# Patient Record
Sex: FEMALE | Race: WHITE | Employment: FULL TIME | URBAN - METROPOLITAN AREA
[De-identification: names, ages, dates, MRNs, and addresses within clinical notes are randomized per-mention and may not be internally consistent; named-entity substitution may affect disease eponyms.]

---

## 2018-11-20 ENCOUNTER — OFFICE VISIT (OUTPATIENT)
Dept: OBGYN CLINIC | Age: 33
End: 2018-11-20

## 2018-11-20 ENCOUNTER — TELEPHONE (OUTPATIENT)
Dept: OBGYN CLINIC | Age: 33
End: 2018-11-20

## 2018-11-20 VITALS
SYSTOLIC BLOOD PRESSURE: 109 MMHG | WEIGHT: 153 LBS | BODY MASS INDEX: 28.16 KG/M2 | HEIGHT: 62 IN | DIASTOLIC BLOOD PRESSURE: 74 MMHG

## 2018-11-20 DIAGNOSIS — O21.9 NAUSEA AND VOMITING OF PREGNANCY, ANTEPARTUM: Primary | ICD-10-CM

## 2018-11-20 PROBLEM — Z98.891 HX OF CESAREAN SECTION: Status: ACTIVE | Noted: 2018-11-20

## 2018-11-20 RX ORDER — BUPROPION HYDROCHLORIDE 150 MG/1
150 TABLET, EXTENDED RELEASE ORAL
COMMUNITY
End: 2019-03-25 | Stop reason: SDUPTHER

## 2018-11-20 RX ORDER — SERTRALINE HYDROCHLORIDE 50 MG/1
TABLET, FILM COATED ORAL
Refills: 1 | COMMUNITY
Start: 2018-10-21 | End: 2019-03-07 | Stop reason: ALTCHOICE

## 2018-11-20 RX ORDER — ONDANSETRON 4 MG/1
4 TABLET, ORALLY DISINTEGRATING ORAL
Qty: 30 TAB | Refills: 0
Start: 2018-11-20 | End: 2018-11-21

## 2018-11-20 RX ORDER — ALBUTEROL SULFATE 90 UG/1
AEROSOL, METERED RESPIRATORY (INHALATION)
COMMUNITY
Start: 2017-09-19 | End: 2018-11-30 | Stop reason: SDUPTHER

## 2018-11-20 RX ORDER — PYRIDOXINE HCL (VITAMIN B6) 25 MG
25 TABLET ORAL 2 TIMES DAILY
COMMUNITY
End: 2019-05-30 | Stop reason: ALTCHOICE

## 2018-11-20 NOTE — TELEPHONE ENCOUNTER
Patient calling stating that she is a new patient to the practice and is currently pregnant. She has her NOB appt scheduled 11/30/2018 and needed a rx for zofran to help with her nausea and vomiting. Patient advised that we cannot prescribe medication since she has not been established in our office and offered her a problem visit to come in and see Jose Cisneros CNM to get something for nausea and vomiting. She is aware that this appt is not for an US it is only for a problem visit. Patient verbalized understanding.

## 2018-11-20 NOTE — PROGRESS NOTES
Quan Jang presents for pre-NOB exam with c/o significant nausea and vomiting. She has a NOB exam with dating ultrasound scheduled for next Friday. According to her LMP she is about 7 weeks gestation. She has continued nausea and is vomiting several times per day. 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 She has taken B6 and Phenergan and is not interested in trying Unisom since it makes her sleepy and unable to be alert at work. She would like to take Zofran since she found in helpful with her last pregnancy. We reviewed the potential for cardiac arrhythmia and the ideal of taking the lowest effective dose for the shortest amount of time. We also reviewed non-pharmalogic methods of treatment to include real shawna, lemon drops, peppermint, crackers, seabands and other options. She understood and was agreeable to taking the lowest tolerable dose to relieve her symptoms. Greater than 50% of this visit was spent in counseling.

## 2018-11-20 NOTE — PROGRESS NOTES
Chief Complaint   Patient presents with    Nausea    Vomiting During Pregnancy     ,  Visit Vitals  /74   Ht 5' 2\" (1.575 m)   Wt 153 lb (69.4 kg)   BMI 27.98 kg/m²     Here today for nausea and vomiting

## 2018-11-21 ENCOUNTER — TELEPHONE (OUTPATIENT)
Dept: OBGYN CLINIC | Age: 33
End: 2018-11-21

## 2018-11-21 RX ORDER — ONDANSETRON 4 MG/1
4 TABLET, ORALLY DISINTEGRATING ORAL
Qty: 30 TAB | Refills: 0 | Status: SHIPPED | OUTPATIENT
Start: 2018-11-21 | End: 2018-11-26 | Stop reason: DRUGHIGH

## 2018-11-21 NOTE — TELEPHONE ENCOUNTER
Call received at 644am    29year old patient last seen in the office yesterday. Patient is about 7 weeks pregnant. Patient denies vaginal bleeding or cramping. Patient reports nausea and is calling to say that the prescription that was to have been sent is not at the pharmacy. This nurse resent the prescription as per Nurse mid wife order to patient preferred pharmacy as it had been sent no print. Patient verbalized understanding.

## 2018-11-26 ENCOUNTER — TELEPHONE (OUTPATIENT)
Dept: OBGYN CLINIC | Age: 33
End: 2018-11-26

## 2018-11-26 RX ORDER — ONDANSETRON 8 MG/1
8 TABLET, ORALLY DISINTEGRATING ORAL
Qty: 60 TAB | Refills: 3 | Status: SHIPPED | OUTPATIENT
Start: 2018-11-26 | End: 2019-06-17 | Stop reason: SDUPTHER

## 2018-11-26 NOTE — TELEPHONE ENCOUNTER
Call received at 335PM    29year old  about 7 weeks pregnant . Patient last seen in the office on 18. Patient denies vaginal bleeding and cramping. Patient calling to report that the 4 mg of Zofran was not working and she has doubled to taking 8 mg as per Nurse Mid wife Lj White. Patient reports the 8 mg is helping and gives her 6-8 hours of relief. Patient reports she only has two pills left    Please advised of prescription details.

## 2018-11-26 NOTE — TELEPHONE ENCOUNTER
Called the patient and left a message about the prescription for ondansetron 8 mg tabs e-Rx'd to her pharmacy.

## 2018-11-30 ENCOUNTER — INITIAL PRENATAL (OUTPATIENT)
Dept: OBGYN CLINIC | Age: 33
End: 2018-11-30

## 2018-11-30 ENCOUNTER — OFFICE VISIT (OUTPATIENT)
Dept: OBGYN CLINIC | Age: 33
End: 2018-11-30

## 2018-11-30 VITALS
HEIGHT: 62 IN | BODY MASS INDEX: 28.43 KG/M2 | WEIGHT: 154.5 LBS | HEART RATE: 90 BPM | SYSTOLIC BLOOD PRESSURE: 111 MMHG | DIASTOLIC BLOOD PRESSURE: 68 MMHG

## 2018-11-30 DIAGNOSIS — Z34.80 SUPERVISION OF OTHER NORMAL PREGNANCY, ANTEPARTUM: ICD-10-CM

## 2018-11-30 DIAGNOSIS — Z34.81 ENCOUNTER FOR SUPERVISION OF OTHER NORMAL PREGNANCY IN FIRST TRIMESTER: Primary | ICD-10-CM

## 2018-11-30 DIAGNOSIS — O09.01 SUPERVISION OF PREGNANCY WITH HISTORY OF INFERTILITY IN FIRST TRIMESTER: ICD-10-CM

## 2018-11-30 PROBLEM — B00.9 HSV-2 INFECTION: Status: ACTIVE | Noted: 2018-11-30

## 2018-11-30 PROBLEM — F41.9 ANXIETY: Status: ACTIVE | Noted: 2018-11-30

## 2018-11-30 LAB
BILIRUB UR QL STRIP: NEGATIVE
GLUCOSE UR-MCNC: NEGATIVE MG/DL
KETONES P FAST UR STRIP-MCNC: NEGATIVE MG/DL
PH UR STRIP: 8 [PH] (ref 4.6–8)
PROT UR QL STRIP: NEGATIVE
SP GR UR STRIP: 1.01 (ref 1–1.03)
UA UROBILINOGEN AMB POC: NORMAL (ref 0.2–1)
URINALYSIS CLARITY POC: CLEAR
URINALYSIS COLOR POC: YELLOW
URINE BLOOD POC: NEGATIVE
URINE LEUKOCYTES POC: NEGATIVE
URINE NITRITES POC: NEGATIVE

## 2018-11-30 RX ORDER — CETIRIZINE HCL 10 MG
TABLET ORAL
COMMUNITY

## 2018-11-30 NOTE — PROGRESS NOTES
Chief Complaint Patient presents with  Pregnancy Visit Vitals /68 Pulse 90 Ht 5' 2\" (1.575 m) Wt 154 lb 8 oz (70.1 kg) LMP 10/03/2018 (Exact Date) BMI 28.26 kg/m² 1. Have you been to the ER, urgent care clinic since your last visit? Hospitalized since your last visit? No 
 
2. Have you seen or consulted any other health care providers outside of the [address] [place] [address] since your last visit? Include any pap smears or colon screening. No  
 
Here today for planned pregnancy

## 2018-11-30 NOTE — PROGRESS NOTES
Subjective:  
 
34yo   woman is being seen today for her first obstetrical visit. This  a planned pregnancy. She's been trying to conceive for 2 years - unexplained fertility. Her LMP was 10/3/18. With an [facility] of  7/15/18 by [address] today. She's had difficulty with N/V this pregnancy but it is now under control with Zofran 8 mg Her obstetrical history is significant for  delivery for breech. Her medical history is significant for HSV type 2, anxiety/depression, h/o LEEP, asthma Her current medications include: Prenatal vitamins, albuterol inhaler, Wellbutrin, Zoloft, Zofran She is seeking midwifery care Denies travel to [facility] affected area. Last pap 2017 History fully reviewed- see chart See patient intake form for complete ROS, scanned in pt chart, under media tab. ROS negative other than nausea Objective:  
 
Refer to Prenatal Flowsheet and Physical for exam findings. Fundal Height 7 cm The patient appears well, alert, oriented x 3, in no distress. Visit Vitals /68 Pulse 90 Ht 5' 2\" (1.575 m) Wt 154 lb 8 oz (70.1 kg) LMP 10/03/2018 (Exact Date) BMI 28.26 kg/m² Neck supple. No adenopathy or thyromegaly. Lungs are clear, good air entry, no wheezes, rhonchi or rales. S1 and S2 normal, no murmurs, regular rate and rhythm. Abdomen soft without tenderness, guarding, mass or organomegaly. Extremities show no edema. Neurological is normal, no focal findings. BREAST EXAM: breasts appear normal, no suspicious masses, no skin or nipple changes or axillary nodes PELVIC EXAM: normal external genitalia, vulva, vagina, cervix, uterus and adnexa Pap smear collected Assessment:  
 
Pregnancy at  7 and 4/7 weeks HSV II 
H/o LTCS desires  Anxiety/depression managed with Wellbutrin and Zoloft Nausea and vomiting of pregnancy managed on Zofran 
 
 
Plan:  
 
Labs: New OB lab slip given. Urine culture and GC/Chlamydia done Pap 2017 normal per patient Continue prenatal vitamins. Genetic screening options reviewed. At this time the patient declines Midwifery care/philosophy discussed including MD collaboration New OB packet given and reviewed, including nutrition, exercise, what to expect at prenatal visits, common complaints, danger signs and practice info. Invited to Centering pregnancy group meeting 12/13/18. Given brochure with provider, dates, and time. At this time patient plans to attend. Follow-up in 2 weeks

## 2018-11-30 NOTE — ASSESSMENT & PLAN NOTE
Collaborating MD 
Centering desires WILLA by: [address] 7/15/18 Genetic screening declines Social Lives with , Anton Camacho, of 3 years, her daughter Collins Brito) and his daughter (9yo) NOB labs 20 wk anatomy Gtt Rhogam 
Tdap    Flu Circ Labor Plans Medical risk factors:  issues of concern: H/o LTCS for breech desires  
HSV type 2 - no outbreaks since initial 
Anxiety/depression

## 2018-12-02 LAB
BACTERIA UR CULT: NO GROWTH
C TRACH RRNA SPEC QL NAA+PROBE: NEGATIVE
N GONORRHOEA RRNA SPEC QL NAA+PROBE: NEGATIVE

## 2018-12-04 LAB
25(OH)D3+25(OH)D2 SERPL-MCNC: 26.3 NG/ML (ref 30–100)
ABO GROUP BLD: NORMAL
BILIRUB UR QL STRIP: NEGATIVE
BLD GP AB SCN SERPL QL: NEGATIVE
ERYTHROCYTE [DISTWIDTH] IN BLOOD BY AUTOMATED COUNT: 13.4 % (ref 12.3–15.4)
GLUCOSE UR-MCNC: NEGATIVE MG/DL
HBV SURFACE AG SERPL QL IA: NEGATIVE
HCT VFR BLD AUTO: 38.8 % (ref 34–46.6)
HGB BLD-MCNC: 12.5 G/DL (ref 11.1–15.9)
HIV 1+2 AB+HIV1 P24 AG SERPL QL IA: NON REACTIVE
KETONES P FAST UR STRIP-MCNC: NEGATIVE MG/DL
MCH RBC QN AUTO: 30 PG (ref 26.6–33)
MCHC RBC AUTO-ENTMCNC: 32.2 G/DL (ref 31.5–35.7)
MCV RBC AUTO: 93 FL (ref 79–97)
PH UR STRIP: 8 [PH] (ref 4.6–8)
PLATELET # BLD AUTO: 223 X10E3/UL (ref 150–379)
PROT UR QL STRIP: NEGATIVE
RBC # BLD AUTO: 4.16 X10E6/UL (ref 3.77–5.28)
RH BLD: POSITIVE
RPR SER QL: NON REACTIVE
RUBV IGG SERPL IA-ACNC: 3.96 INDEX
SP GR UR STRIP: 1.01 (ref 1–1.03)
T PALLIDUM AB SER QL IA: NEGATIVE
UA UROBILINOGEN AMB POC: NORMAL (ref 0.2–1)
URINALYSIS CLARITY POC: CLEAR
URINALYSIS COLOR POC: YELLOW
URINE BLOOD POC: NEGATIVE
URINE LEUKOCYTES POC: NEGATIVE
URINE NITRITES POC: NEGATIVE
WBC # BLD AUTO: 7.6 X10E3/UL (ref 3.4–10.8)

## 2018-12-04 NOTE — PROGRESS NOTES
Subjective:     Duke Johnson, 28 yo   woman is being seen today for her first obstetrical visit. This planned a planned pregnancy. Her LMP was 10/3/18. With an [address] of  7/10/18    US today showed viable pregnancy with EDC of 7/15/18  Her obstetrical history is significant for LTCS for breech presentation. Her medical history is significant for anxiety/depression, asthma  Her current medications include: Prenatal vitamins  She is seeking midwifery care  Denies travel to [facility] affected area. History fully reviewed- see chart    See patient intake form for complete ROS, scanned in pt chart, under media tab. ROS negative    Objective:     Refer to Prenatal Flowsheet and Physical for exam findings. Fundal Height 7-8 weeks    The patient appears well, alert, oriented x 3, in no distress. Visit Vitals  LMP 10/03/2018 (Exact Date)   Breastfeeding? No     Neck supple. No adenopathy or thyromegaly. Lungs are clear, good air entry, no wheezes, rhonchi or rales. S1 and S2 normal, no murmurs, regular rate and rhythm. Abdomen soft without tenderness, guarding, mass or organomegaly. Extremities show no edema. Neurological is normal, no focal findings. BREAST EXAM: breasts appear normal, no suspicious masses, no skin or nipple changes or axillary nodes    PELVIC EXAM: VULVA: normal appearing vulva with no masses, tenderness or lesions   Pap smear collected    Assessment:     Pregnancy at  7 and 3/7 weeks    Plan:     Labs: New OB lab slip given. Urine culture and GC/Chlamydia  Continue prenatal vitamins. Genetic screening options reviewed. At this time the patient declines  Midwifery care/philosophy discussed including MD collaboration  New OB packet given and reviewed, including nutrition, exercise, what to expect at prenatal visits, common complaints, danger signs and practice info. Invited to Centering pregnancy group and she is interested.  Given brochure with provider, dates, and time.   Follow-up in 4 weeks

## 2018-12-13 NOTE — PROGRESS NOTES
Tried to call the patient and had a busy signal.  Since all lab results were normal with the exception of the Vitamin D, will review at next visit.

## 2018-12-19 ENCOUNTER — ROUTINE PRENATAL (OUTPATIENT)
Dept: OBGYN CLINIC | Age: 33
End: 2018-12-19

## 2018-12-19 VITALS — DIASTOLIC BLOOD PRESSURE: 77 MMHG | SYSTOLIC BLOOD PRESSURE: 128 MMHG | BODY MASS INDEX: 29.12 KG/M2 | WEIGHT: 159.2 LBS

## 2018-12-19 DIAGNOSIS — Z34.81 ENCOUNTER FOR SUPERVISION OF OTHER NORMAL PREGNANCY IN FIRST TRIMESTER: Primary | ICD-10-CM

## 2018-12-19 DIAGNOSIS — O26.891 DYSURIA DURING PREGNANCY IN FIRST TRIMESTER: ICD-10-CM

## 2018-12-19 DIAGNOSIS — R30.0 DYSURIA DURING PREGNANCY IN FIRST TRIMESTER: ICD-10-CM

## 2018-12-19 RX ORDER — NITROFURANTOIN 25; 75 MG/1; MG/1
100 CAPSULE ORAL 2 TIMES DAILY
Qty: 14 CAP | Refills: 0 | Status: SHIPPED | OUTPATIENT
Start: 2018-12-19 | End: 2019-01-03 | Stop reason: ALTCHOICE

## 2018-12-19 NOTE — PROGRESS NOTES
S:  Presents with c/o pain with urination x 2 days. States she is managing nausea with taking one zofran most days. Denies travel to [place] affected area. O: See prenatal flowsheet for maternal and fetal exam  Blood pressure 128/77, weight 159 lb 3.2 oz (72.2 kg), last menstrual period 10/03/2018. A:  11w0d   Size=Dates    P: Reviewed lab work. Will start taking Vitamin D.   Rx Macrobid  Urine culture and sensitivity  Discussed prenatal classes  See prenatal checklist for other topics covered during today's visit  RTO in 4 weeks for GINO with CHENTE

## 2018-12-19 NOTE — PROGRESS NOTES
Chief Complaint   Patient presents with    Routine Prenatal Visit     1. Have you been to the ER, urgent care clinic since your last visit? Hospitalized since your last visit? No    2. Have you seen or consulted any other health care providers outside of the [address] [place] [address] since your last visit? Include any pap smears or colon screening. No       Here today for a routine prenatal visit and she has no complaints or concerns.

## 2018-12-21 LAB — BACTERIA UR CULT: NORMAL

## 2019-01-03 ENCOUNTER — ROUTINE PRENATAL (OUTPATIENT)
Dept: OBGYN CLINIC | Age: 34
End: 2019-01-03

## 2019-01-03 ENCOUNTER — LAB ONLY (OUTPATIENT)
Dept: OBGYN CLINIC | Age: 34
End: 2019-01-03

## 2019-01-03 VITALS — DIASTOLIC BLOOD PRESSURE: 68 MMHG | SYSTOLIC BLOOD PRESSURE: 115 MMHG | WEIGHT: 159 LBS | BODY MASS INDEX: 29.08 KG/M2

## 2019-01-03 DIAGNOSIS — Z34.81 ENCOUNTER FOR SUPERVISION OF OTHER NORMAL PREGNANCY IN FIRST TRIMESTER: Primary | ICD-10-CM

## 2019-01-03 DIAGNOSIS — Z34.80 SUPERVISION OF OTHER NORMAL PREGNANCY, ANTEPARTUM: Primary | ICD-10-CM

## 2019-01-03 DIAGNOSIS — Z98.891 HX OF CESAREAN SECTION: ICD-10-CM

## 2019-01-04 NOTE — PROGRESS NOTES
Centering session 1    Reviewed group expectations, nutrition, weight gain, fetal growth and development, measurement of gestational age, weight and blood pressure, schedule for visits, routine tests, and setting personal goals. Doing well, no complaints today. FHR obtained by Ultrasound.   Had flu vaccine at work  See prenatal flowsheet    P:  NIPT today  20 week ordered at next visit   RTO 4 weeks

## 2019-01-24 ENCOUNTER — ROUTINE PRENATAL (OUTPATIENT)
Dept: OBGYN CLINIC | Age: 34
End: 2019-01-24

## 2019-01-24 VITALS — DIASTOLIC BLOOD PRESSURE: 70 MMHG | SYSTOLIC BLOOD PRESSURE: 112 MMHG | WEIGHT: 160 LBS | BODY MASS INDEX: 29.26 KG/M2

## 2019-01-24 DIAGNOSIS — Z34.80 SUPERVISION OF OTHER NORMAL PREGNANCY, ANTEPARTUM: Primary | ICD-10-CM

## 2019-01-25 NOTE — PROGRESS NOTES
Centering session 2  Xavi Garsia, PT and Bhupinder Chacon PT present  Reviewed exercise recommendations, body mechanics  Reviewed pregnancy changes and common discomforts  Dental care  Emotional changes in pregnancy discussed    Pt is doing well, no complaints.   Anatomy ultrasound scheduled  See flowsheet    RTO 3 weeks

## 2019-02-21 ENCOUNTER — ROUTINE PRENATAL (OUTPATIENT)
Dept: OBGYN CLINIC | Age: 34
End: 2019-02-21

## 2019-02-21 VITALS — WEIGHT: 161.8 LBS | DIASTOLIC BLOOD PRESSURE: 72 MMHG | SYSTOLIC BLOOD PRESSURE: 122 MMHG | BODY MASS INDEX: 29.59 KG/M2

## 2019-02-21 DIAGNOSIS — Z34.82 ENCOUNTER FOR SUPERVISION OF NORMAL PREGNANCY IN MULTIGRAVIDA IN SECOND TRIMESTER: Primary | ICD-10-CM

## 2019-02-21 NOTE — PROGRESS NOTES
S: Feeling well. No significant complaints or concerns. Reports consistent, daily fetal mvmt. Denies ctxs, LOF, or vaginal bldng. [facility]. O: See prenatal flowsheet for maternal and fetal exam  Last menstrual period 10/03/2018.     A:  20w1d   Size=Dates    P: Reviewed US  See prenatal checklist for other topics covered during today's visit  RTO in 2 weeks for Centering

## 2019-02-21 NOTE — PROGRESS NOTES
Chief Complaint   Patient presents with    Routine Prenatal Visit     Visit Vitals  /72   Wt 161 lb 12.8 oz (73.4 kg)   LMP 10/03/2018 (Exact Date)   BMI 29.59 kg/m²     1. Have you been to the ER, urgent care clinic since your last visit? Hospitalized since your last visit? No    2. Have you seen or consulted any other health care providers outside of the [address] [place] [address] since your last visit? Include any pap smears or colon screening. No     Here today for a routine prenatal visit and she has no complaints or concerns.

## 2019-03-07 ENCOUNTER — ROUTINE PRENATAL (OUTPATIENT)
Dept: OBGYN CLINIC | Age: 34
End: 2019-03-07

## 2019-03-07 VITALS — BODY MASS INDEX: 29.9 KG/M2 | DIASTOLIC BLOOD PRESSURE: 62 MMHG | SYSTOLIC BLOOD PRESSURE: 106 MMHG | WEIGHT: 163.5 LBS

## 2019-03-07 DIAGNOSIS — Z98.891 HX OF CESAREAN SECTION: ICD-10-CM

## 2019-03-07 DIAGNOSIS — Z34.82 ENCOUNTER FOR SUPERVISION OF NORMAL PREGNANCY IN MULTIGRAVIDA IN SECOND TRIMESTER: Primary | ICD-10-CM

## 2019-03-25 RX ORDER — SERTRALINE HYDROCHLORIDE 50 MG/1
50 TABLET, FILM COATED ORAL DAILY
Refills: 5 | COMMUNITY
Start: 2019-03-03 | End: 2019-03-29 | Stop reason: DRUGHIGH

## 2019-03-25 RX ORDER — BUPROPION HYDROCHLORIDE 150 MG/1
150 TABLET, EXTENDED RELEASE ORAL DAILY
Qty: 30 TAB | Refills: 5 | Status: SHIPPED | OUTPATIENT
Start: 2019-03-25 | End: 2019-10-15 | Stop reason: SDUPTHER

## 2019-03-28 ENCOUNTER — ROUTINE PRENATAL (OUTPATIENT)
Dept: OBGYN CLINIC | Age: 34
End: 2019-03-28

## 2019-03-28 DIAGNOSIS — O99.340 ANXIETY DISORDER AFFECTING PREGNANCY, ANTEPARTUM: ICD-10-CM

## 2019-03-28 DIAGNOSIS — F41.9 ANXIETY DISORDER AFFECTING PREGNANCY, ANTEPARTUM: ICD-10-CM

## 2019-03-28 DIAGNOSIS — O09.01 SUPERVISION OF PREGNANCY WITH HISTORY OF INFERTILITY IN FIRST TRIMESTER: Primary | ICD-10-CM

## 2019-03-29 VITALS — WEIGHT: 170 LBS | SYSTOLIC BLOOD PRESSURE: 106 MMHG | BODY MASS INDEX: 31.09 KG/M2 | DIASTOLIC BLOOD PRESSURE: 68 MMHG

## 2019-03-29 RX ORDER — SERTRALINE HYDROCHLORIDE 50 MG/1
100 TABLET, FILM COATED ORAL DAILY
Qty: 30 TAB | Refills: 1 | Status: SHIPPED | OUTPATIENT
Start: 2019-03-29 | End: 2019-05-06 | Stop reason: SDUPTHER

## 2019-04-17 ENCOUNTER — ROUTINE PRENATAL (OUTPATIENT)
Dept: OBGYN CLINIC | Age: 34
End: 2019-04-17

## 2019-04-17 VITALS — WEIGHT: 169 LBS | SYSTOLIC BLOOD PRESSURE: 119 MMHG | DIASTOLIC BLOOD PRESSURE: 66 MMHG | BODY MASS INDEX: 30.91 KG/M2

## 2019-04-17 DIAGNOSIS — Z3A.28 28 WEEKS GESTATION OF PREGNANCY: Primary | ICD-10-CM

## 2019-04-17 DIAGNOSIS — Z23 ENCOUNTER FOR IMMUNIZATION: ICD-10-CM

## 2019-04-17 DIAGNOSIS — Z34.80 SUPERVISION OF OTHER NORMAL PREGNANCY, ANTEPARTUM: ICD-10-CM

## 2019-04-17 NOTE — PROGRESS NOTES
Chief Complaint   Patient presents with    Routine Prenatal Visit     Visit Vitals  /66   Wt 169 lb (76.7 kg)   LMP 10/03/2018 (Exact Date)   BMI 30.91 kg/m²     1. Have you been to the ER, urgent care clinic since your last visit? Hospitalized since your last visit? No    2. Have you seen or consulted any other health care providers outside of the [address] [place] [address] since your last visit? Include any pap smears or colon screening. No     Here today for a routine prenatal visit and she has no complaints or concerns. After obtaining consent, and per orders of aggie delgado cnm, injection of tdap given in left deltoid by Chris Call RN. Patient instructed to remain in clinic for 20 minutes afterwards, and to report any adverse reaction to me immediately. Lot: [id number] Exp: 6/14/21 NDC: 30438-149-14 , VIS given.

## 2019-04-17 NOTE — PROGRESS NOTES
S: Feeling well. No significant complaints or concerns. Reports consistent, daily fetal mvmt. Denies ctxs, LOF, or vaginal bldng. [facility]. O: See prenatal flowsheet for maternal and fetal exam  Blood pressure 119/66, weight 169 lb (76.7 kg), last menstrual period 10/03/2018. A:  28w0d   Size=Dates    P: Third trimester labs today  Tdap given  See prenatal checklist for other topics covered during today's visit  RTO in 1 weeks for Centering    Discussed USPFT recommendation for Tdap during every pregnancy, optimal timing of administration between 27 to 36 weeks gestation-although can be done at any time during pregnancy, and that people that will be in close contact with her infant during the first year should also receive a Tdap vaccine if they have not previously received the Tdap vaccine. Pt states understanding. Pt received Tdap vaccine today.

## 2019-04-18 LAB
ERYTHROCYTE [DISTWIDTH] IN BLOOD BY AUTOMATED COUNT: 13.4 % (ref 12.3–15.4)
GLUCOSE 1H P 50 G GLC PO SERPL-MCNC: 115 MG/DL (ref 65–139)
HCT VFR BLD AUTO: 34.6 % (ref 34–46.6)
HGB BLD-MCNC: 11.4 G/DL (ref 11.1–15.9)
MCH RBC QN AUTO: 30.7 PG (ref 26.6–33)
MCHC RBC AUTO-ENTMCNC: 32.9 G/DL (ref 31.5–35.7)
MCV RBC AUTO: 93 FL (ref 79–97)
PLATELET # BLD AUTO: 148 X10E3/UL (ref 150–379)
RBC # BLD AUTO: 3.71 X10E6/UL (ref 3.77–5.28)
WBC # BLD AUTO: 8.8 X10E3/UL (ref 3.4–10.8)

## 2019-04-19 PROBLEM — D69.6 THROMBOCYTOPENIA AFFECTING PREGNANCY (HCC): Status: ACTIVE | Noted: 2019-04-19

## 2019-04-19 PROBLEM — O99.119 THROMBOCYTOPENIA AFFECTING PREGNANCY (HCC): Status: ACTIVE | Noted: 2019-04-19

## 2019-05-01 ENCOUNTER — TELEPHONE (OUTPATIENT)
Dept: OBGYN CLINIC | Age: 34
End: 2019-05-01

## 2019-05-01 DIAGNOSIS — R10.2 PELVIC PAIN AFFECTING PREGNANCY IN THIRD TRIMESTER, ANTEPARTUM: Primary | ICD-10-CM

## 2019-05-01 DIAGNOSIS — O26.893 PELVIC PAIN AFFECTING PREGNANCY IN THIRD TRIMESTER, ANTEPARTUM: Primary | ICD-10-CM

## 2019-05-01 NOTE — TELEPHONE ENCOUNTER
Isabela Albarran called and left message. I called her and got voice mail and left message to have her call me back on listed cell/home phone number.

## 2019-05-06 DIAGNOSIS — O99.340 ANXIETY DISORDER AFFECTING PREGNANCY, ANTEPARTUM: ICD-10-CM

## 2019-05-06 DIAGNOSIS — F41.9 ANXIETY DISORDER AFFECTING PREGNANCY, ANTEPARTUM: ICD-10-CM

## 2019-05-06 RX ORDER — SERTRALINE HYDROCHLORIDE 50 MG/1
TABLET, FILM COATED ORAL
Qty: 30 TAB | Refills: 0 | Status: SHIPPED | OUTPATIENT
Start: 2019-05-06 | End: 2019-05-30 | Stop reason: SDUPTHER

## 2019-05-09 ENCOUNTER — ROUTINE PRENATAL (OUTPATIENT)
Dept: OBGYN CLINIC | Age: 34
End: 2019-05-09

## 2019-05-09 VITALS — DIASTOLIC BLOOD PRESSURE: 66 MMHG | SYSTOLIC BLOOD PRESSURE: 105 MMHG | BODY MASS INDEX: 31.93 KG/M2 | WEIGHT: 174.6 LBS

## 2019-05-09 DIAGNOSIS — Z98.891 HX OF CESAREAN SECTION: Primary | ICD-10-CM

## 2019-05-09 DIAGNOSIS — Z34.80 SUPERVISION OF OTHER NORMAL PREGNANCY, ANTEPARTUM: ICD-10-CM

## 2019-05-10 NOTE — PROGRESS NOTES
Centering Session 7    Feeling well. Denies complaints. Reports good FM. Denies UC's, menstrual-like cramping, VB or LOF.    VSS. See prenatal flowsheet for maternal and fetal exam    IUP at 31+1 weeks  Labs reviewed. S=D  Attended TOLAC class    Plan:   care reviewed, including sleeping safety, bathing safety, feeding safety, immunizations, diapering, responding to infant cues, circumcision, SIDs, and other general safety concerns r/t /. Discussed selection of Pediatrician. Reviewed fetal movement counts.   RTO 2 weeks

## 2019-05-23 ENCOUNTER — ROUTINE PRENATAL (OUTPATIENT)
Dept: OBGYN CLINIC | Age: 34
End: 2019-05-23

## 2019-05-23 VITALS
HEIGHT: 62 IN | WEIGHT: 172 LBS | BODY MASS INDEX: 31.65 KG/M2 | SYSTOLIC BLOOD PRESSURE: 123 MMHG | HEART RATE: 134 BPM | DIASTOLIC BLOOD PRESSURE: 77 MMHG

## 2019-05-23 DIAGNOSIS — D69.6 THROMBOCYTOPENIA (HCC): Primary | ICD-10-CM

## 2019-05-23 DIAGNOSIS — O99.119 THROMBOCYTOPENIA AFFECTING PREGNANCY (HCC): ICD-10-CM

## 2019-05-23 DIAGNOSIS — Z34.80 SUPERVISION OF OTHER NORMAL PREGNANCY, ANTEPARTUM: ICD-10-CM

## 2019-05-23 DIAGNOSIS — D69.6 THROMBOCYTOPENIA AFFECTING PREGNANCY (HCC): ICD-10-CM

## 2019-05-23 NOTE — PROGRESS NOTES
Midwife pt. Here for TOLAC counseling. Prev LTCS for breech. Op report reviewed in [facility]. Had ROM with onset of spontaneous labor. Counseled risk of uterine rupture 0.5-1% with spontaneous labor. This can be catastrophic. Risks to baby include permanent brain damage, mental retardation, CP or even fetal death. Risks to her include hemorrhage, need for transfusion, hysterectomy, surgical complications including injury to bowel and bladder. In general, would be hesitant to induce labor as this can significantly increase the risk for above complications. [facility]  calculator estimates 63% chance of successful . ACOG info provided. Can schedule C/S for 41-42wks (or sooner if she prefers). Advised to let midwives know and then they can notify me to schedule. No other c/o +fm. Plt mildly decreased ()=148. Reports h/o mild thrombocytopenia to 120s with previous pregnancy. Will rpt CBC today.

## 2019-05-23 NOTE — PATIENT INSTRUCTIONS
Weeks 32 to 34 of Your Pregnancy: Care Instructions  Your Care Instructions    During the last few weeks of your pregnancy, you may have more aches and pains. It's important to rest when you can. Your growing baby is putting more pressure on your bladder. So you may need to urinate more often. Hemorrhoids are also common. These are painful, itchy veins in the rectal area. In the 36th week, most women have a test for group B streptococcus (GBS). GBS is a common bacteria that can live in the vagina and rectum. It can make your baby sick after birth. If you test positive, you will get antibiotics during labor. These will keep your baby from getting the bacteria. You may want to talk with your doctor about banking your baby's umbilical cord blood. This is the blood left in the cord after birth. If you want to save this blood, you must arrange it ahead of time. You can't decide at the last minute. If you haven't already had the Tdap shot during this pregnancy, talk to your doctor about getting it. It will help protect your  against pertussis infection. Follow-up care is a key part of your treatment and safety. Be sure to make and go to all appointments, and call your doctor if you are having problems. It's also a good idea to know your test results and keep a list of the medicines you take. How can you care for yourself at home? Ease hemorrhoids  · Get more liquids, fruits, vegetables, and fiber in your diet. This will help keep your stools soft. · Avoid sitting for too long. Lie on your left side several times a day. · Clean yourself with soft, moist toilet paper. Or you can use witch hazel pads or personal hygiene pads. · If you are uncomfortable, try ice packs. Or you can sit in a warm sitz bath. Do these for 20 minutes at a time, as needed. · Use hydrocortisone cream for pain and itching. Two examples are Anusol and Preparation H Hydrocortisone.   · Ask your doctor about taking an over-the-counter stool softener. Consider breastfeeding  · Experts recommend that women breastfeed for 1 year or longer. Breast milk is the perfect food for babies. · Breast milk is easier for babies to digest than formula. And it is always available, just the right temperature, and free. · Breast milk may help protect your child from some health problems.  babies are less likely than formula-fed babies to:  ? Get ear infections, colds, diarrhea, and pneumonia. ? Be obese or get diabetes later in life. · Women who breastfeed have less bleeding after the birth. Their uteruses also shrink back faster. · Some women who breastfeed lose weight faster. Making milk burns calories. · Breastfeeding can lower your risk of breast cancer, ovarian cancer, and osteoporosis. Decide about circumcision for boys  · As you make this decision, it may help to think about your personal, Yazidi, and family traditions. You get to decide if you will keep your son's penis natural or if he will be circumcised. · If you decide that you would like to have your baby circumcised, talk with your doctor. You can share your concerns about pain. And you can discuss your preferences for anesthesia. Where can you learn more? Go to http://laith-franklin.info/. Enter K381 in the search box to learn more about \"Weeks 32 to 34 of Your Pregnancy: Care Instructions. \"  Current as of: September 5, 2018  Content Version: 11.9  © 1522-8937 [employer]. Care instructions adapted under license by [employer] (which disclaims liability or warranty for this information). If you have questions about a medical condition or this instruction, always ask your healthcare professional. [place] [address] any warranty or liability for your use of this information.

## 2019-05-24 LAB
ERYTHROCYTE [DISTWIDTH] IN BLOOD BY AUTOMATED COUNT: 13.7 % (ref 12.3–15.4)
HCT VFR BLD AUTO: 33 % (ref 34–46.6)
HGB BLD-MCNC: 10.9 G/DL (ref 11.1–15.9)
MCH RBC QN AUTO: 29.5 PG (ref 26.6–33)
MCHC RBC AUTO-ENTMCNC: 33 G/DL (ref 31.5–35.7)
MCV RBC AUTO: 89 FL (ref 79–97)
PLATELET # BLD AUTO: 158 X10E3/UL (ref 150–450)
RBC # BLD AUTO: 3.69 X10E6/UL (ref 3.77–5.28)
WBC # BLD AUTO: 10.2 X10E3/UL (ref 3.4–10.8)

## 2019-05-24 NOTE — PROGRESS NOTES
FYI.  Plt normal.  I would repeat in 4wks as she reports h/o gestational thrombocytopenia with previous pregnancy.

## 2019-05-30 ENCOUNTER — ROUTINE PRENATAL (OUTPATIENT)
Dept: OBGYN CLINIC | Age: 34
End: 2019-05-30

## 2019-05-30 VITALS — DIASTOLIC BLOOD PRESSURE: 68 MMHG | SYSTOLIC BLOOD PRESSURE: 106 MMHG | BODY MASS INDEX: 31.93 KG/M2 | WEIGHT: 174.6 LBS

## 2019-05-30 DIAGNOSIS — J06.9 URI WITH COUGH AND CONGESTION: ICD-10-CM

## 2019-05-30 DIAGNOSIS — O99.340 ANXIETY DISORDER AFFECTING PREGNANCY, ANTEPARTUM: ICD-10-CM

## 2019-05-30 DIAGNOSIS — F41.9 ANXIETY DISORDER AFFECTING PREGNANCY, ANTEPARTUM: ICD-10-CM

## 2019-05-30 DIAGNOSIS — Z98.891 HX OF CESAREAN SECTION: ICD-10-CM

## 2019-05-30 DIAGNOSIS — Z34.80 SUPERVISION OF OTHER NORMAL PREGNANCY, ANTEPARTUM: Primary | ICD-10-CM

## 2019-05-30 RX ORDER — SERTRALINE HYDROCHLORIDE 50 MG/1
TABLET, FILM COATED ORAL
Qty: 30 TAB | Refills: 0 | Status: SHIPPED | OUTPATIENT
Start: 2019-05-30 | End: 2019-07-04 | Stop reason: SDUPTHER

## 2019-05-30 RX ORDER — CODEINE PHOSPHATE AND GUAIFENESIN 10; 100 MG/5ML; MG/5ML
5 SOLUTION ORAL
Qty: 75 ML | Refills: 0 | Status: SHIPPED | OUTPATIENT
Start: 2019-05-30 | End: 2019-06-04

## 2019-05-31 NOTE — PROGRESS NOTES
S: Feeling fair. URI symtoms with nighttime cough and difficulty sleeping due to same. Reports consistent, daily fetal mvmt. Denies ctxs, LOF, or vaginal bldng. Centering visit 9 with final pregnancy issues, TOLAC  visit and PP care reviewed. Support given and PP depression reviewed. O: See prenatal flowsheet for maternal and fetal exam. Plts reviewed. No fever. Blood pressure 106/68, weight 174 lb 9.6 oz (79.2 kg), last menstrual period 10/03/2018. A:  34w1d   Size=Dates  URI without evidence of need for antibiotics. P: Robitussin with codeine for cough with zyrtec. Repeat plts at 36 weeks.   See prenatal checklist for other topics covered during today's visit  RTO in 2 weeks for GINO with CNM

## 2019-06-05 ENCOUNTER — TELEPHONE (OUTPATIENT)
Dept: OBGYN CLINIC | Age: 34
End: 2019-06-05

## 2019-06-12 ENCOUNTER — ROUTINE PRENATAL (OUTPATIENT)
Dept: OBGYN CLINIC | Age: 34
End: 2019-06-12

## 2019-06-12 VITALS
HEART RATE: 114 BPM | BODY MASS INDEX: 31.83 KG/M2 | HEIGHT: 62 IN | DIASTOLIC BLOOD PRESSURE: 72 MMHG | WEIGHT: 173 LBS | SYSTOLIC BLOOD PRESSURE: 115 MMHG

## 2019-06-12 DIAGNOSIS — B00.9 HSV-2 INFECTION COMPLICATING PREGNANCY, THIRD TRIMESTER: ICD-10-CM

## 2019-06-12 DIAGNOSIS — Z98.891 HX OF CESAREAN SECTION: ICD-10-CM

## 2019-06-12 DIAGNOSIS — O98.513 HSV-2 INFECTION COMPLICATING PREGNANCY, THIRD TRIMESTER: ICD-10-CM

## 2019-06-12 DIAGNOSIS — Z3A.36 36 WEEKS GESTATION OF PREGNANCY: ICD-10-CM

## 2019-06-12 DIAGNOSIS — Z34.80 SUPERVISION OF OTHER NORMAL PREGNANCY, ANTEPARTUM: Primary | ICD-10-CM

## 2019-06-12 RX ORDER — VALACYCLOVIR HYDROCHLORIDE 1 G/1
1000 TABLET, FILM COATED ORAL DAILY
Qty: 30 TAB | Refills: 0 | Status: SHIPPED | OUTPATIENT
Start: 2019-06-12 | End: 2019-07-25

## 2019-06-12 NOTE — PROGRESS NOTES
S: Feeling poorly - having nausea and diarrhea. Reports consistent, daily fetal mvmt. Denies ctxs, LOF, or vaginal bldng. Denies travel to [facility] affected area. O: See prenatal flowsheet for maternal and fetal exam  Last menstrual period 10/03/2018. A:  36w0d   Size=Dates  FH 35 Cm, PDT130  bpm  Cervical exam 0/50/-4    P:  Orders Placed This Encounter    GROUP B STREP BY SARA    valACYclovir (VALTREX) 1 gram tablet     Sig: Take 1 Tab by mouth daily.  Indications: suppression of HSV during labor     Dispense:  30 Tab     Refill:  0       Reviewed \"three sisters of balance\" and gave handout  Will repeat PLT / CBC next visit  See prenatal checklist for other topics covered during today's visit  RTO in 1 weeks for GINO with CHENTE

## 2019-06-14 LAB
GP B STREP DNA SPEC QL NAA+PROBE: POSITIVE
GRBS, EXTERNAL: POSITIVE

## 2019-06-16 NOTE — PROGRESS NOTES
.Please abstract and put in snapshot. Thanks. Mychart message sent to [facility] about positive testing.

## 2019-06-18 RX ORDER — ONDANSETRON 8 MG/1
TABLET, ORALLY DISINTEGRATING ORAL
Qty: 60 TAB | Refills: 0 | Status: SHIPPED | OUTPATIENT
Start: 2019-06-18 | End: 2019-07-25

## 2019-06-19 ENCOUNTER — ROUTINE PRENATAL (OUTPATIENT)
Dept: OBGYN CLINIC | Age: 34
End: 2019-06-19

## 2019-06-19 VITALS — DIASTOLIC BLOOD PRESSURE: 86 MMHG | BODY MASS INDEX: 29.08 KG/M2 | WEIGHT: 159 LBS | SYSTOLIC BLOOD PRESSURE: 132 MMHG

## 2019-06-19 DIAGNOSIS — B00.9 HSV-2 INFECTION COMPLICATING PREGNANCY, THIRD TRIMESTER: ICD-10-CM

## 2019-06-19 DIAGNOSIS — Z98.891 HX OF CESAREAN SECTION: ICD-10-CM

## 2019-06-19 DIAGNOSIS — Z34.80 SUPERVISION OF OTHER NORMAL PREGNANCY, ANTEPARTUM: Primary | ICD-10-CM

## 2019-06-19 DIAGNOSIS — O98.513 HSV-2 INFECTION COMPLICATING PREGNANCY, THIRD TRIMESTER: ICD-10-CM

## 2019-06-19 DIAGNOSIS — Z3A.37 37 WEEKS GESTATION OF PREGNANCY: ICD-10-CM

## 2019-06-19 NOTE — PROGRESS NOTES
Chief Complaint   Patient presents with    Routine Prenatal Visit     Visit Vitals  /86   Wt 159 lb (72.1 kg)   LMP 10/03/2018 (Exact Date)   BMI 29.08 kg/m²     1. Have you been to the ER, urgent care clinic since your last visit? Hospitalized since your last visit? No    2. Have you seen or consulted any other health care providers outside of the [address] [place] [address] since your last visit? Include any pap smears or colon screening. No    Here today for a routine prenatal visit and she has no complaints or concerns.

## 2019-06-19 NOTE — PROGRESS NOTES
France Meredith was informed of GBS test at appointment. Please abstract and put in snapshot.  Thanks

## 2019-06-19 NOTE — PROGRESS NOTES
S: Feeling well. No significant complaints or concerns. Reports consistent, daily fetal mvmt. Denies ctxs, LOF, or vaginal bldng. [facility]. Brought in birth vision and wants cervical exam.      O: See prenatal flowsheet for maternal and fetal exam  Blood pressure 132/86, weight 159 lb (72.1 kg), last menstrual period 10/03/2018.     A:  37w0d   Size=dates  FH 37 cm,  bpm  Cervical exam 1.5/50/-4    P:   Discussed positive GBS status and labor plans  Reviewed birth vision - discussed forceps / vacuum vs   See prenatal checklist for other topics covered during today's visit  RTO in 1 weeks for GINO with CHENTE

## 2019-06-26 ENCOUNTER — ROUTINE PRENATAL (OUTPATIENT)
Dept: OBGYN CLINIC | Age: 34
End: 2019-06-26

## 2019-06-26 VITALS — BODY MASS INDEX: 32.37 KG/M2 | DIASTOLIC BLOOD PRESSURE: 77 MMHG | SYSTOLIC BLOOD PRESSURE: 113 MMHG | WEIGHT: 177 LBS

## 2019-06-26 DIAGNOSIS — Z34.80 SUPERVISION OF OTHER NORMAL PREGNANCY, ANTEPARTUM: Primary | ICD-10-CM

## 2019-06-26 NOTE — PROGRESS NOTES
S: Feeling well. No significant complaints or concerns. Reports consistent, daily fetal mvmt. Denies ctxs, LOF, or vaginal bldng. Mild contractions for the past few days. Increased lower extremity edema  Denies travel to [facility] affected area. O: See prenatal flowsheet for maternal and fetal exam  Blood pressure 113/77, weight 177 lb (80.3 kg), last menstrual period 10/03/2018.   FH 39 cm, FHT's 140 bpm  1+ pedal edema    A:  38w0d   Size=Dates    P:   Orders Placed This Encounter    PLATELET COUNT     See prenatal checklist for other topics covered during today's visit  RTO in 1 weeks for GINO with CHENTE

## 2019-06-27 LAB — PLATELET # BLD AUTO: 136 X10E3/UL (ref 150–450)

## 2019-07-03 ENCOUNTER — ROUTINE PRENATAL (OUTPATIENT)
Dept: OBGYN CLINIC | Age: 34
End: 2019-07-03

## 2019-07-03 VITALS — BODY MASS INDEX: 32.19 KG/M2 | SYSTOLIC BLOOD PRESSURE: 131 MMHG | WEIGHT: 176 LBS | DIASTOLIC BLOOD PRESSURE: 86 MMHG

## 2019-07-03 DIAGNOSIS — Z34.80 SUPERVISION OF OTHER NORMAL PREGNANCY, ANTEPARTUM: Primary | ICD-10-CM

## 2019-07-04 DIAGNOSIS — O99.340 ANXIETY DISORDER AFFECTING PREGNANCY, ANTEPARTUM: ICD-10-CM

## 2019-07-04 DIAGNOSIS — F41.9 ANXIETY DISORDER AFFECTING PREGNANCY, ANTEPARTUM: ICD-10-CM

## 2019-07-05 RX ORDER — SERTRALINE HYDROCHLORIDE 50 MG/1
TABLET, FILM COATED ORAL
Qty: 60 TAB | Refills: 0 | Status: SHIPPED | OUTPATIENT
Start: 2019-07-05 | End: 2019-07-05 | Stop reason: SDUPTHER

## 2019-07-05 RX ORDER — SERTRALINE HYDROCHLORIDE 100 MG/1
TABLET, FILM COATED ORAL
Qty: 30 TAB | Refills: 2 | Status: SHIPPED | OUTPATIENT
Start: 2019-07-05 | End: 2021-05-25 | Stop reason: DRUGHIGH

## 2019-07-07 ENCOUNTER — HOSPITAL ENCOUNTER (INPATIENT)
Age: 34
LOS: 2 days | Discharge: HOME | End: 2019-07-09
Attending: OBSTETRICS & GYNECOLOGY | Admitting: ADVANCED PRACTICE MIDWIFE
Payer: COMMERCIAL

## 2019-07-07 PROBLEM — Z34.90 PREGNANCY: Status: ACTIVE | Noted: 2019-07-07

## 2019-07-07 LAB
BASOPHILS # BLD: 0 K/UL (ref 0–0.1)
BASOPHILS NFR BLD: 0 % (ref 0–1)
BLASTS NFR BLD MANUAL: 0 %
DIFFERENTIAL METHOD BLD: ABNORMAL
EOSINOPHIL # BLD: 0.3 K/UL (ref 0–0.4)
EOSINOPHIL NFR BLD: 3 % (ref 0–7)
ERYTHROCYTE [DISTWIDTH] IN BLOOD BY AUTOMATED COUNT: 13.3 % (ref 11.5–14.5)
HCT VFR BLD AUTO: 37.1 % (ref 35–47)
HGB BLD-MCNC: 12.2 G/DL (ref 11.5–16)
IMM GRANULOCYTES # BLD AUTO: 0 K/UL
IMM GRANULOCYTES NFR BLD AUTO: 0 %
LYMPHOCYTES # BLD: 1.1 K/UL (ref 0.8–3.5)
LYMPHOCYTES NFR BLD: 11 % (ref 12–49)
MCH RBC QN AUTO: 27.9 PG (ref 26–34)
MCHC RBC AUTO-ENTMCNC: 32.9 G/DL (ref 30–36.5)
MCV RBC AUTO: 84.9 FL (ref 80–99)
METAMYELOCYTES NFR BLD MANUAL: 0 %
MONOCYTES # BLD: 0.5 K/UL (ref 0–1)
MONOCYTES NFR BLD: 5 % (ref 5–13)
MYELOCYTES NFR BLD MANUAL: 0 %
NEUTS BAND NFR BLD MANUAL: 0 % (ref 0–6)
NEUTS SEG # BLD: 8.5 K/UL (ref 1.8–8)
NEUTS SEG NFR BLD: 81 % (ref 32–75)
NRBC # BLD: 0 K/UL (ref 0–0.01)
NRBC BLD-RTO: 0 PER 100 WBC
OTHER CELLS NFR BLD MANUAL: 0 %
PLATELET # BLD AUTO: 158 K/UL (ref 150–400)
PMV BLD AUTO: 11.9 FL (ref 8.9–12.9)
PROMYELOCYTES NFR BLD MANUAL: 0 %
RBC # BLD AUTO: 4.37 M/UL (ref 3.8–5.2)
RBC MORPH BLD: ABNORMAL
WBC # BLD AUTO: 10.4 K/UL (ref 3.6–11)

## 2019-07-07 PROCEDURE — 85027 COMPLETE CBC AUTOMATED: CPT

## 2019-07-07 PROCEDURE — 74011250636 HC RX REV CODE- 250/636: Performed by: ADVANCED PRACTICE MIDWIFE

## 2019-07-07 PROCEDURE — 77030021125

## 2019-07-07 PROCEDURE — 75410000003 HC RECOV DEL/VAG/CSECN EA 0.5 HR: Performed by: OBSTETRICS & GYNECOLOGY

## 2019-07-07 PROCEDURE — 77010026065 HC OXYGEN MINIMUM MEDICAL AIR: Performed by: OBSTETRICS & GYNECOLOGY

## 2019-07-07 PROCEDURE — 75410000000 HC DELIVERY VAGINAL/SINGLE: Performed by: OBSTETRICS & GYNECOLOGY

## 2019-07-07 PROCEDURE — 74011250636 HC RX REV CODE- 250/636

## 2019-07-07 PROCEDURE — 36415 COLL VENOUS BLD VENIPUNCTURE: CPT

## 2019-07-07 PROCEDURE — 75410000002 HC LABOR FEE PER 1 HR: Performed by: OBSTETRICS & GYNECOLOGY

## 2019-07-07 PROCEDURE — 74011250637 HC RX REV CODE- 250/637: Performed by: ADVANCED PRACTICE MIDWIFE

## 2019-07-07 PROCEDURE — 65270000029 HC RM PRIVATE

## 2019-07-07 RX ORDER — ONDANSETRON 4 MG/1
4 TABLET, ORALLY DISINTEGRATING ORAL
Status: ACTIVE | OUTPATIENT
Start: 2019-07-07 | End: 2019-07-08

## 2019-07-07 RX ORDER — OXYCODONE AND ACETAMINOPHEN 5; 325 MG/1; MG/1
1 TABLET ORAL
Status: DISCONTINUED | OUTPATIENT
Start: 2019-07-07 | End: 2019-07-09 | Stop reason: HOSPADM

## 2019-07-07 RX ORDER — SODIUM CHLORIDE 900 MG/100ML
INJECTION INTRAVENOUS
Status: DISCONTINUED
Start: 2019-07-07 | End: 2019-07-07

## 2019-07-07 RX ORDER — IBUPROFEN 800 MG/1
800 TABLET ORAL EVERY 8 HOURS
Status: DISCONTINUED | OUTPATIENT
Start: 2019-07-07 | End: 2019-07-09 | Stop reason: HOSPADM

## 2019-07-07 RX ORDER — LIDOCAINE HYDROCHLORIDE 10 MG/ML
INJECTION INFILTRATION; PERINEURAL
Status: DISCONTINUED
Start: 2019-07-07 | End: 2019-07-07 | Stop reason: WASHOUT

## 2019-07-07 RX ORDER — OXYTOCIN/RINGER'S LACTATE 20/1000 ML
125-500 PLASTIC BAG, INJECTION (ML) INTRAVENOUS ONCE
Status: ACTIVE | OUTPATIENT
Start: 2019-07-07 | End: 2019-07-08

## 2019-07-07 RX ORDER — ACETAMINOPHEN 325 MG/1
650 TABLET ORAL
Status: DISCONTINUED | OUTPATIENT
Start: 2019-07-07 | End: 2019-07-09 | Stop reason: HOSPADM

## 2019-07-07 RX ORDER — SODIUM CHLORIDE 0.9 % (FLUSH) 0.9 %
5-40 SYRINGE (ML) INJECTION EVERY 8 HOURS
Status: DISCONTINUED | OUTPATIENT
Start: 2019-07-07 | End: 2019-07-09 | Stop reason: HOSPADM

## 2019-07-07 RX ORDER — SODIUM CHLORIDE, SODIUM LACTATE, POTASSIUM CHLORIDE, CALCIUM CHLORIDE 600; 310; 30; 20 MG/100ML; MG/100ML; MG/100ML; MG/100ML
125 INJECTION, SOLUTION INTRAVENOUS CONTINUOUS
Status: DISCONTINUED | OUTPATIENT
Start: 2019-07-07 | End: 2019-07-09 | Stop reason: HOSPADM

## 2019-07-07 RX ORDER — SIMETHICONE 80 MG
80 TABLET,CHEWABLE ORAL
Status: DISCONTINUED | OUTPATIENT
Start: 2019-07-07 | End: 2019-07-09 | Stop reason: HOSPADM

## 2019-07-07 RX ORDER — NALOXONE HYDROCHLORIDE 0.4 MG/ML
0.4 INJECTION, SOLUTION INTRAMUSCULAR; INTRAVENOUS; SUBCUTANEOUS AS NEEDED
Status: DISCONTINUED | OUTPATIENT
Start: 2019-07-07 | End: 2019-07-07

## 2019-07-07 RX ORDER — PENICILLIN G POTASSIUM 5000000 [IU]/1
INJECTION, POWDER, FOR SOLUTION INTRAMUSCULAR; INTRAVENOUS
Status: COMPLETED
Start: 2019-07-07 | End: 2019-07-07

## 2019-07-07 RX ORDER — NALOXONE HYDROCHLORIDE 0.4 MG/ML
0.4 INJECTION, SOLUTION INTRAMUSCULAR; INTRAVENOUS; SUBCUTANEOUS AS NEEDED
Status: DISCONTINUED | OUTPATIENT
Start: 2019-07-07 | End: 2019-07-09 | Stop reason: HOSPADM

## 2019-07-07 RX ORDER — HYDROCORTISONE ACETATE PRAMOXINE HCL 2.5; 1 G/100G; G/100G
CREAM TOPICAL AS NEEDED
Status: DISCONTINUED | OUTPATIENT
Start: 2019-07-07 | End: 2019-07-09 | Stop reason: HOSPADM

## 2019-07-07 RX ORDER — DOCUSATE SODIUM 100 MG/1
100 CAPSULE, LIQUID FILLED ORAL
Status: DISCONTINUED | OUTPATIENT
Start: 2019-07-07 | End: 2019-07-09 | Stop reason: HOSPADM

## 2019-07-07 RX ORDER — SODIUM CHLORIDE 0.9 % (FLUSH) 0.9 %
5-40 SYRINGE (ML) INJECTION AS NEEDED
Status: DISCONTINUED | OUTPATIENT
Start: 2019-07-07 | End: 2019-07-09 | Stop reason: HOSPADM

## 2019-07-07 RX ORDER — OXYTOCIN/0.9 % SODIUM CHLORIDE 30/500 ML
PLASTIC BAG, INJECTION (ML) INTRAVENOUS
Status: COMPLETED
Start: 2019-07-07 | End: 2019-07-07

## 2019-07-07 RX ADMIN — IBUPROFEN 800 MG: 800 TABLET ORAL at 17:51

## 2019-07-07 RX ADMIN — SODIUM CHLORIDE, SODIUM LACTATE, POTASSIUM CHLORIDE, AND CALCIUM CHLORIDE: 600; 310; 30; 20 INJECTION, SOLUTION INTRAVENOUS at 16:11

## 2019-07-07 RX ADMIN — OXYTOCIN-SODIUM CHLORIDE 0.9% IV SOLN 30 UNIT/500ML 30000 MILLI-UNITS: 30-0.9/5 SOLUTION at 17:12

## 2019-07-07 RX ADMIN — PENICILLIN G POTASSIUM 5 MILLION UNITS: 5000000 POWDER, FOR SOLUTION INTRAMUSCULAR; INTRAPLEURAL; INTRATHECAL; INTRAVENOUS at 16:07

## 2019-07-07 RX ADMIN — OXYCODONE HYDROCHLORIDE AND ACETAMINOPHEN 1 TABLET: 5; 325 TABLET ORAL at 22:23

## 2019-07-07 RX ADMIN — OXYCODONE HYDROCHLORIDE AND ACETAMINOPHEN 1 TABLET: 5; 325 TABLET ORAL at 17:51

## 2019-07-07 NOTE — PROGRESS NOTES
S: Feeling well. No significant complaints or concerns. Reports consistent, daily fetal mvmt. Denies ctxs, LOF, or vaginal bldng. Patient is very anxious to deliver the baby. 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 Wants time at home with the baby before she goes back to work. Denies travel to [place] affected area. O: See prenatal flowsheet for maternal and fetal exam  Blood pressure 131/86, weight 176 lb (79.8 kg), last menstrual period 10/03/2018.     A:  39w4d   Size=Dates    P: Scheduled IOL for 40 weeks gestation  See prenatal checklist for other topics covered during today's visit  RTO in 1 weeks for GINO with CHENTE

## 2019-07-07 NOTE — PROGRESS NOTES
QBL from delivery & 2h PP is total: 369  1925 up to BR to void, w/steady gait & standby.   1935 phone report called to [facility] Karime HERNANDES on [facility]
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
## 2019-07-07 NOTE — L&D DELIVERY NOTE
Delivery Summary    Patient: Joslyn Powell MRN: [id number]  SSN: [id number]    YOB: 1985  Age: 36 y.o. Sex: female       Information for the patient's :  Dell Roberto [[id number]]       Labor Events:    Labor: No    Steroids: None   Cervical Ripening Date/Time:       Cervical Ripening Type: None   Antibiotics During Labor:     Rupture Identifier:      Rupture Date/Time: 2019 4:10 PM   Rupture Type: SROM   Amniotic Fluid Volume: Moderate    Amniotic Fluid Description: Clear    Amniotic Fluid Odor:      Induction: None       Induction Date/Time:        Indications for Induction:      Augmentation: None   Augmentation Date/Time:      Indications for Augmentation:     Labor complications: Additional complications:        Delivery Events:  Indications For Episiotomy:     Episiotomy: None   Perineal Laceration(s): 1st   Repaired: No    Periurethral Laceration Location:      Repaired:     Labial Laceration Location:     Repaired:     Sulcal Laceration Location:     Repaired:     Vaginal Laceration Location:     Repaired:     Cervical Laceration Location:     Repaired:     Repair Suture: None   Number of Repair Packets:     Estimated Blood Loss (ml):  ml     Delivery Date: 2019    Delivery Time: 5:03 PM  Delivery Type: Vaginal, Spontaneous  Sex:  Female    Gestational Age: 43w3d   Delivery Clinician:  Eun Mcarthur  Living Status: Living   Delivery Location: L&D            APGARS  One minute Five minutes Ten minutes   Skin color: 0   1        Heart rate: 2   2        Grimace: 2   2        Muscle tone: 2   2        Breathin   2        Totals: 8   9            Presentation: Vertex    Position:        Resuscitation Method:  Tactile Stimulation;Suctioning-bulb     Meconium Stained: None      Cord Information: 3 Vessels  Complications: Nuchal Cord Without Compressions  Cord around: head  Delayed cord clamping?  Yes  Cord clamped date/time:2019  5:06 PM  Disposition of Cord Blood: Lab    Blood Gases Sent?: No    Placenta:  Date/Time: 2019  5:12 PM  Removal: Spontaneous      Appearance: Normal      Measurements:  Birth Weight:        Birth Length:        Head Circumference:        Chest Circumference:       Abdominal Girth: Other Providers:   ;LUCRECIA Vicente;;;;;Jacek KEE;, Obstetrician;Primary Nurse;Primary [place] Nurse;Nicu Nurse;Neonatologist;Anesthesiologist;Crna;Tech;Midwife;Nursery Nurse           Group B Strep:   Lab Results   Component Value Date/Time    GrBStrep, External POSITIVE 2019     Information for the patient's :  Grace Ramires [[id number]]   No results found for: ABORH, PCTABR, PCTDIG, BILI, ABORHEXT, ABORH    No results for input(s): PCO2CB, PO2CB, HCO3I, SO2I, IBD, PTEMPI, SPECTI, PHICB, ISITE, IDEV, IALLEN in the last 72 hours. Spontaneous rupture of membranes at 1610. Colen Confer spontaneously began baring down. She pushed effectively and delivered a live female child over a first degree perineal laceration in left lateral tilt at 1703. Head delivered OA and nucal cord x 1 was reduced. Head restituted to ROT and shoulders came easily with next push. Infant was lifted to her mother's abdomen by friend, Dixie Martines. Infant was dried, stimulated, and bulb suctioned. She cried and had good tone. When the cord stopped pulsating, it was double clamped and cut by the father. at [phone]. The placenta and membranes delivered spontaneously at 1712. Inspected and grossly intact with a three vessel cord. Uterus initially atonic but firmed with massage. Pitocin IV infusing. Vagina and perineum inspected and found to have a hemostatic first degree perineal laceration which was not repaired. Infant remained skin to skin and breastfeeding initiated.

## 2019-07-08 PROBLEM — Z34.80 SUPERVISION OF OTHER NORMAL PREGNANCY, ANTEPARTUM: Status: RESOLVED | Noted: 2018-11-30 | Resolved: 2019-07-08

## 2019-07-08 PROBLEM — O21.9 NAUSEA AND VOMITING OF PREGNANCY, ANTEPARTUM: Status: RESOLVED | Noted: 2018-11-20 | Resolved: 2019-07-08

## 2019-07-08 PROBLEM — O99.119 THROMBOCYTOPENIA AFFECTING PREGNANCY (HCC): Status: RESOLVED | Noted: 2019-04-19 | Resolved: 2019-07-08

## 2019-07-08 PROBLEM — D69.6 THROMBOCYTOPENIA AFFECTING PREGNANCY (HCC): Status: RESOLVED | Noted: 2019-04-19 | Resolved: 2019-07-08

## 2019-07-08 PROBLEM — Z34.90 PREGNANCY: Status: RESOLVED | Noted: 2019-07-07 | Resolved: 2019-07-08

## 2019-07-08 PROCEDURE — 74011250637 HC RX REV CODE- 250/637: Performed by: ADVANCED PRACTICE MIDWIFE

## 2019-07-08 PROCEDURE — 65270000029 HC RM PRIVATE

## 2019-07-08 RX ADMIN — IBUPROFEN 800 MG: 800 TABLET ORAL at 01:46

## 2019-07-08 RX ADMIN — OXYCODONE HYDROCHLORIDE AND ACETAMINOPHEN 1 TABLET: 5; 325 TABLET ORAL at 11:07

## 2019-07-08 RX ADMIN — OXYCODONE HYDROCHLORIDE AND ACETAMINOPHEN 1 TABLET: 5; 325 TABLET ORAL at 21:26

## 2019-07-08 RX ADMIN — IBUPROFEN 800 MG: 800 TABLET ORAL at 18:03

## 2019-07-08 RX ADMIN — IBUPROFEN 800 MG: 800 TABLET ORAL at 09:59

## 2019-07-08 NOTE — PROGRESS NOTES
Bedside shift change report given to Addi Paniagua RN (oncoming nurse) by Vasu Ramachandran RN (offgoing nurse). Report included the following information SBAR, Kardex, Intake/Output and MAR.

## 2019-07-08 NOTE — PROGRESS NOTES
CNMPostpartumNoteDay1  S: Patient ambulating and voiding without difficulty. Patient happy with birth experience. Breastfeeding well. No complaints. Bleeding is light. Pain managed with medication.      O: Vital signs stable, Heart RRR without murmur, Lungs CTA bilaterally, FF below umbilicus, lochia rubra light, breasts soft, nipples intact, no edema     A: Postpartum Day 1  Breastfeeding  O pos/Rubella immune/GBS pos (received one dose of antibiotics prior to delivery)     P: Continue current postpartum orders  Lactation support/consult   Anticipate discharge tomorrow  Offer Tdap if has not had this pregnancy

## 2019-07-08 NOTE — ROUTINE PROCESS
Bedside and Verbal shift change report given to MARTHA Dooley (oncoming nurse) by Jerrell Burks RN (offgoing nurse). Report included the following information SBAR, Intake/Output, MAR and Recent Results.

## 2019-07-08 NOTE — LACTATION NOTE
This note was copied from a baby's chart. Reviewed breastfeeding basics:  Supply and demand,  stomach size, early  Feeding cues, skin to skin, positioning and baby led latch-on, assymetrical latch with signs of good, deep latch vs shallow, feeding frequency and duration, and log sheet for tracking infant feedings and output. Breastfeeding Booklet and Warm line information given. Discussed typical  weight loss and the importance of infant weight checks with pediatrician 1-2 post discharge. Mom states nursed first for about a year,. States nursing has been going well. Pt will successfully establish breastfeeding by feeding in response to early feeding cues   or wake every 3h, will obtain deep latch, and will keep log of feedings/output. Taught to BF at hunger cues and or q 2-3 hrs and to offer 10-20 drops of hand expressed colostrum at any non-feeds. Breast Assessment  Left Breast: Medium  Left Nipple: Everted, Intact  Right Breast: Medium  Right Nipple: Intact  Breast- Feeding Assessment  Attends Breast-Feeding Classes: No  Breast-Feeding Experience: Yes(about a year with first)  Type/Quality: Good(per mom,not seen at breast)           Discussed with mother her plan for feeding. Reviewed the benefits of exclusive breast milk feeding during the hospital stay. Informed her of the risks of using formula to supplement in the first few days of life as well as the benefits of successful breast milk feeding; referred her to the Breastfeeding booklet about this information. She acknowledges understanding of information reviewed and states that it is her plan to breast feed  her infant. Will support her choice and offer additional information as needed.

## 2019-07-08 NOTE — ROUTINE PROCESS
2010: Transferred pt to MIU, via wheelchair for routine progression of care. Report was called to [facility] on MIU by EDISON Luong RN.

## 2019-07-09 VITALS
DIASTOLIC BLOOD PRESSURE: 73 MMHG | WEIGHT: 176 LBS | RESPIRATION RATE: 17 BRPM | HEIGHT: 62 IN | BODY MASS INDEX: 32.39 KG/M2 | TEMPERATURE: 97.8 F | HEART RATE: 78 BPM | SYSTOLIC BLOOD PRESSURE: 123 MMHG

## 2019-07-09 PROCEDURE — 74011250637 HC RX REV CODE- 250/637: Performed by: ADVANCED PRACTICE MIDWIFE

## 2019-07-09 RX ADMIN — IBUPROFEN 800 MG: 800 TABLET ORAL at 11:55

## 2019-07-09 RX ADMIN — IBUPROFEN 800 MG: 800 TABLET ORAL at 03:59

## 2019-07-09 NOTE — PROGRESS NOTES
CNMPostpartumNoteDay2- Discharge     S: Patient ambulating and voiding without difficulty. Breastfeeding well. No complaints. Ready to go home. Bleeding is minimal.  Pain managed with ibuprofen.      O: Vital signs stable, Heart RRR without murmur, Lungs CTA bilaterally, FF below umbilicus, lochia rubra light, breasts soft, nipples intact, no edema     A: Postpartum Day 2-   Breastfeeding  Stable     P: Follow routine postpartum discharge instructions  Discharge home with baby  Ibuprofen OTC up to 600 mg every 6 hours as needed for pain or cramping  Continue PNV while breastfeeding  Continue stool softner until comfortable with bowel movements  Follow-up with CNM in 2 weeks as directed

## 2019-07-09 NOTE — LACTATION NOTE
This note was copied from a baby's chart. Mom states baby has been nursing well. Not seen at breast.  Asked mom to call with next feeding. Pt will successfully establish breastfeeding by feeding in response to early feeding cues   or wake every 3h, will obtain deep latch, and will keep log of feedings/output. Taught to BF at hunger cues and or q 2-3 hrs and to offer 10-20 drops of hand expressed colostrum at any non-feeds. Breast Assessment  Left Breast: Medium  Left Nipple: Everted, Intact  Right Breast: Medium  Right Nipple: Everted, Intact  Breast- Feeding Assessment  Attends Breast-Feeding Classes: No  Breast-Feeding Experience: Yes(about a year with first)  Type/Quality: Good(per mom, not seen at breast)           Breast Feeding Discharge Information    Chart shows numerous feedings, void, stool WNL. Discussed Importance of monitoring outputs and feedings on first week of  Breastfeeding. Discussed ways to tell if baby getting enough, ie  Voids and stools, by day 7, baby should have at least  4-6 wet diapers a day, change in color of stool to a seedy yellow, and return to birth wt within 2 weeks with a steady increase after that. .  Follow up with pediatrician visit for weight check in 1-2 days reviewed. Discussed Breast feeding support groups and encouraged to call Warm line number, [phone]  for any breast feeding questions or problems that arise. Please leave a message and let us know what is going on. We will return your call within 24 hours. Breast feeding Support groups meet at [facility] the first and third Wednesday of the month from 11-12 noon. Meetings are held in a classroom past the cafeteria on the first floor. Feedings  Encouraged mom to attempt feeding with baby led feeding cues. Just as sucking on fingers, rooting, mouthing. Looking for 8-12 feedings in 24 hours. Don't limit baby at breast, allow baby to come off breast on it's own.  Baby may want to feed  often and may increase number of feedings on second day of life. Skin to skin encouraged. In 4-6 weeks, baby may go though a growth spurt and increase feedings for several days to increase your milk supply. If baby doesn't nurse,  Mom should Pump or hand express drops, 12-18 drops, and give infant any expressed milk. If not pumping any milk, mom should contact pediatrician for possible need for supplementation. MOM's DIET    Discussed eating a healthy diet. Instructed mother to eat a variety of foods in order to get a well balanced diet. She should consume an extra 300-500 calories per day (more than her non-pregnant requirement.) These extra calories will help provide energy needed for optimal breast milk production. Mother also encouraged to \"drink to thirst\" and it is recommended that she drink fluids such as water and fruit/vegetable juice. Nutritious snacks should be available so that she can eat throughout the day to help satisfy her hunger and maintain a good milk supply. Continue taking your Prenatal vitamins as long as you breast feed. Engorgement Care Guidelines:  Anticipatory guidance shared. If breast become engorged, to help decrease engorgement. Frequent breastfeeding encouraged, cool packs around breast after nursing may help. May take motrin or Ibuprofen as ordered by your Doctor. Call your doctor, midwife and/or lactation consultant if:   Melony Ferrera is having no wet or dirty diapers    Baby has dark colored urine after day 3  (should be pale yellow to clear)    Baby has dark colored stools after day 4  (should be mustard yellow, with no meconium)    Baby has fewer wet/soiled diapers or nurses less   frequently than the goals listed here    Mom has symptoms of mastitis   (sore breast with fever, chills, flu-like aching)      Discussed breast feeding discharge information with mom.

## 2019-07-09 NOTE — DISCHARGE INSTRUCTIONS
POST DELIVERY DISCHARGE INSTRUCTIONS    Name: Ahmet Garcia  YOB: 1985  Primary Diagnosis: Active Problems:    Postpartum care following vaginal delivery (2019)        General:     Diet/Diet Restrictions:  Eight 8-ounce glasses of fluid daily (water, juices); avoid excessive caffeine intake. Meals/snacks as desired which are high in fiber and carbohydrates and low in fat and cholesterol. Physical Activity / Restrictions / Safety:     Avoid heavy lifting, no more that 8 lbs. For 2-3 weeks; limit use of stairs to 2 times daily for the first week home. No driving for one week. Avoid intercourse 4-6 weeks, no douching or tampon use. Check with obstetrician before starting or resuming an exercise program.         Discharge Instructions/Special Treatment/Home Care Needs:     Continue prenatal vitamins. Continue to use squirt bottle with warm water on your episiotomy after each bathroom use until bleeding stops. If steri-strips applied to your incision, remove in 7-10 days. Call your doctor for the following:     Fever over 101 degrees by mouth. Vaginal bleeding heavier than a normal menstrual period or clot larger than a golf ball. Red streaks or increased swelling of legs, painful red streaks on your breast.  Painful urination, constipation and increased pain or swelling or discharge with your incision. If you feel extremely anxious or overwhelmed. If you have thoughts of harming yourself and/or your baby. Pain Management:     Pain Management:   Take Acetaminophen (Tylenol) or Ibuprofen (Advil, Motrin), as directed for pain. Use a warm Sitz bath 3 times daily to relieve episiotomy or hemorrhoidal discomfort. Heating pad to  incision as needed. For hemorrhoidal discomfort, use Tucks and Anusol cream as needed and directed. Follow-Up Care:      These are general instructions for a healthy lifestyle:    No smoking/ No tobacco products/ Avoid exposure to second hand smoke    Surgeon General's Warning:  Quitting smoking now greatly reduces serious risk to your health. Obesity, smoking, and sedentary lifestyle greatly increases your risk for illness    A healthy diet, regular physical exercise & weight monitoring are important for maintaining a healthy lifestyle    Recognize signs and symptoms of STROKE:    F-face looks uneven    A-arms unable to move or move unevenly    S-speech slurred or non-existent    T-time-call 911 as soon as signs and symptoms begin-DO NOT go       Back to bed or wait to see if you get better-TIME IS BRAIN.

## 2019-07-09 NOTE — DISCHARGE SUMMARY
Obstetrical Discharge Summary     Name: Anne Sahu MRN: [id number]  SSN: [id number]    YOB: 1985  Age: 36 y.o. Sex: female      Allergies: Erythromycin; Imitrex [sumatriptan succinate]; Other medication; and Wellbutrin [bupropion]    Admit Date: 2019    Discharge Date: 2019     Admitting Physician: Margret Ugalde CNM     Attending Physician:  Cristo Mancilla MD     * Admission Diagnoses: Pregnancy [Z34.90]    * Discharge Diagnoses:   Information for the patient's :  Mathieu Liu [[id number]]   Delivery of a 6 lb 8.2 oz (2.955 kg) female infant via Vaginal, Spontaneous on 2019 at 5:03 PM  by Margret Ugalde. Apgars were 8  and 9 . Additional Diagnoses:   Hospital Problems as of 2019 Date Reviewed: 2019          Codes Class Noted - Resolved POA    Postpartum care following vaginal delivery ICD-10-CM: Z39.2  ICD-9-CM: V24.2  2019 - Present No        RESOLVED: Pregnancy ICD-10-CM: Z34.90  ICD-9-CM: V22.2  2019 - 2019 Yes             Lab Results   Component Value Date/Time    Rubella, External immune 2013    GrBStrep, External POSITIVE 2019    ABO,Rh O Positive 2013      Immunization History   Administered Date(s) Administered    Influenza Vaccine 2018    Influenza Vaccine PF 2014    TDAP Vaccine 2012    Tdap 2014, 2019       * Procedures: Vaginal birth after  of live female child on 19. Infant wt 6 lbs 8 oz, Ap 8/9,  ml. First degree perineal laceration hemostatic and not repaired.   Attended by Racquel Lomax CNM  * No surgery found *      [place]  Depression Scale  I have been able to laugh and see the funny side of things: As much as I always could  I have looked forward with enjoyment to things: As much as I ever did  I have blamed myself unnecessarily when things went wrong: Not very often  I have been anxious or worried for no good reason: Hardly ever  I have felt scared or panicky for no very good reason: Yes, sometimes  Things have been getting on top of me: No, most of the time I have coped quite well  I have been so unhappy that I have had difficulty sleeping: No, not at all  I have felt sad or miserable: Not very often  I have been so unhappy that I have been crying: Only occasionally  The thought of harming myself has occurred to me: Never  Total Score: 7    * Discharge Condition: good    [facility] Course: Normal hospital course following the delivery. * Disposition: Home    Discharge Medications:   Current Discharge Medication List          * Follow-up Care/Patient Instructions:   Activity: Activity as tolerated  Diet: Regular Diet  Wound Care: None needed    Follow-up Information     Follow up With Specialties Details Why Contact Info    Rg Mcdermott CNM Certified Nurse Midwife Call in 2 weeks PP follow up [address] [facility]  [address]  [address]  [address] [phone]

## 2019-07-25 ENCOUNTER — OFFICE VISIT (OUTPATIENT)
Dept: OBGYN CLINIC | Age: 34
End: 2019-07-25

## 2019-07-25 VITALS — BODY MASS INDEX: 29.08 KG/M2 | DIASTOLIC BLOOD PRESSURE: 84 MMHG | SYSTOLIC BLOOD PRESSURE: 129 MMHG | WEIGHT: 159 LBS

## 2019-07-25 NOTE — PROGRESS NOTES
2 week postpartum visit    Date of delivery: 19  Type of delivery:   Anesthesia: none  Provider: Nai Galdamez CNM  Laceration: first degree, not repaired  Baby's name: Barry Walton  Baby's weight: 6lbs 5 oz  Prenatal complications: none  Labor/delivery complications: none  Last pap: may need at next visit    Lochia:  Dark, scant  Pain: denies  Incontinence: denies  Sleeping 5 hrs/24 hrs  LMP: none  Charlotte score: 6/30    Feelings about birth: \"amazing\"  Feeding method: Breast Problems: none    Contraception plans: considering Mirena    Assessment:  Normal 2 week postpartum visit  Insufficient sleep    Plan: Discussed sleep strategies : going to bed earlier, napping,  helping with infant in evening  Discussed contraception: given Mirena pamphlet and referred to [email or web address]. org  6 week postpartum visit in 4 weeks with Cent[facility]  Will return in 8 weeks for Mirena placement

## 2019-07-25 NOTE — PROGRESS NOTES
Chief Complaint   Patient presents with   [address]     Visit Vitals  /84   Wt 159 lb (72.1 kg)   Breastfeeding? Yes   BMI 29.08 kg/m²     1. Have you been to the ER, urgent care clinic since your last visit? Hospitalized since your last visit? No    2. Have you seen or consulted any other health care providers outside of the [address] [place] [address] since your last visit? Include any pap smears or colon screening. No     Here today for post partum visit.

## 2019-07-31 ENCOUNTER — TELEPHONE (OUTPATIENT)
Dept: OBGYN CLINIC | Age: 34
End: 2019-07-31

## 2019-07-31 NOTE — TELEPHONE ENCOUNTER
Left message to see how things are going post partum after birth due to history of depression. No answer. Message left to have her call back with any issues. Had 2 week post partum visit and doing well at that time.

## 2019-08-16 ENCOUNTER — OFFICE VISIT (OUTPATIENT)
Dept: OBGYN CLINIC | Age: 34
End: 2019-08-16

## 2019-08-16 VITALS — DIASTOLIC BLOOD PRESSURE: 77 MMHG | BODY MASS INDEX: 29.45 KG/M2 | WEIGHT: 161 LBS | SYSTOLIC BLOOD PRESSURE: 114 MMHG

## 2019-08-16 DIAGNOSIS — F41.8 POSTPARTUM ANXIETY: Primary | ICD-10-CM

## 2019-08-16 DIAGNOSIS — F41.9 ANXIETY: ICD-10-CM

## 2019-08-16 RX ORDER — LORAZEPAM 0.5 MG/1
0.5 TABLET ORAL
Qty: 60 TAB | Refills: 1 | Status: SHIPPED | OUTPATIENT
Start: 2019-08-16 | End: 2019-10-12 | Stop reason: SDUPTHER
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
## 2019-08-29 ENCOUNTER — OFFICE VISIT (OUTPATIENT)
Dept: OBGYN CLINIC | Age: 34
End: 2019-08-29

## 2019-08-29 VITALS — DIASTOLIC BLOOD PRESSURE: 77 MMHG | BODY MASS INDEX: 29.81 KG/M2 | SYSTOLIC BLOOD PRESSURE: 115 MMHG | WEIGHT: 163 LBS

## 2019-08-29 DIAGNOSIS — F41.8 POSTPARTUM ANXIETY: Primary | ICD-10-CM
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
## 2019-09-12 ENCOUNTER — OFFICE VISIT (OUTPATIENT)
Dept: OBGYN CLINIC | Age: 34
End: 2019-09-12

## 2019-09-12 DIAGNOSIS — Z30.430 ENCOUNTER FOR INSERTION OF MIRENA IUD: Primary | ICD-10-CM

## 2019-09-12 LAB
HCG URINE, QL. (POC): NEGATIVE
VALID INTERNAL CONTROL?: YES

## 2019-09-12 NOTE — PROGRESS NOTES
Mirena IUD INSERTION  Indications:  Isaías West is a ,  36 y.o. female [facility] No LMP recorded. No period since delivery 8 weeks ago. She  presents for insertion of an IUD. The risks, benefits and alternatives of IUD insertion were discussed in detail at last visit. She also has reviewed Mirena information. She has elected to proceed with the insertion today and she states she has no further questions. A urine pregnancy test was negative  Procedure: The pelvic exam revealed normal external genitalia. On bimanual exam the uterus was midposition and normal in size with no tenderness present. A speculum was inserted into the vagina and the cervix was visualized. The cervix was prepped with zephiran solution. The anterior lip of the cervix was grasped with a single toothed tenaculum. The uterus was sounded with a Colon sound to 7 centimeters. A Mirena was then inserted without difficulty. The string was cut to 3 centimeters. She experienced a mild  amount of cramping. Post Procedure Status:   She tolerated the procedure with mild discomfort. The patient was observed for 20 minutes after the insertion. There were no complications. Patient was discharged in stable condition. The patient received Mirena lot number [id number]. Patient has been experiencing anxiety /depression. EPDS  today. She went back to work this week and it has been a big transition for everyone. Considering the stress of the transition, she feels she is doing pretty well. She has an appointment scheduled with her psychiatric NP for 10/14/19. She has registered on-line for telephone counseling. Will see her back in 10 weeks for IUD check.

## 2019-09-12 NOTE — PROCEDURES
Mirena IUD INSERTION  Indications:  Rosalina Steel is a ,  36 y.o. female [facility] No LMP recorded. No period since delivery 9 weeks ago. She  presents for insertion of an IUD. The risks, benefits and alternatives of IUD insertion were discussed in detail at last visit. She also has reviewed Mirena information. She has elected to proceed with the insertion today and she states she has no further questions. A urine pregnancy test was negative   Procedure: The pelvic exam revealed normal external genitalia. On bimanual exam the uterus was midposition and normal in size with no tenderness present. A speculum was inserted into the vagina and the cervix was visualized. The cervix was prepped with zephiran solution. The anterior lip of the cervix was grasped with a single toothed tenaculum. The uterus was sounded with a Colon sound to 7 centimeters. A Mirena was then inserted without difficulty. The string was cut to 3 centimeters. She experienced a mild  amount of cramping. Post Procedure Status:   She tolerated the procedure with mild discomfort. The patient was observed for 20 minutes after the insertion. There were no complications. Patient was discharged in stable condition. The patient received Mirena lot number [id number].

## 2019-10-15 RX ORDER — BUPROPION HYDROCHLORIDE 150 MG/1
150 TABLET, EXTENDED RELEASE ORAL DAILY
Qty: 30 TAB | Refills: 5 | Status: SHIPPED | OUTPATIENT
Start: 2019-10-15 | End: 2021-05-17

## 2020-06-16 NOTE — PATIENT INSTRUCTIONS
IUD Removal: Care Instructions Your Care Instructions The intrauterine device (IUD) is a method of birth control. It is a small, plastic, T-shaped device that contains copper or hormones. It is placed in your uterus. You may have had your IUD removed because you want to become pregnant. Or maybe it caused pain, bleeding, or an infection. You may have chosen another method of birth control. If you don't want to get pregnant, make sure to use another form of birth control now that your IUD is not in place. Talk to your doctor about other forms of birth control. Follow-up care is a key part of your treatment and safety. Be sure to make and go to all appointments, and call your doctor if you are having problems. It's also a good idea to know your test results and keep a list of the medicines you take. How can you care for yourself at home? · IUD removal does not usually cause any pain or problems if the IUD is removed because you want to become pregnant or because of bleeding. · Once the IUD is taken out, you can become pregnant. If you want to become pregnant, you can start trying to have a baby as soon as you like. · If your doctor prescribed antibiotics because of an infection, take them as directed. Do not stop taking them just because you feel better. You need to take the full course of antibiotics. When should you call for help? Call your doctor now or seek immediate medical care if: 
· You have pain in your belly or pelvis. · You have severe vaginal bleeding. This means that you are soaking through your usual pads or tampons every hour for 2 or more hours. · You have a fever. · You have a vaginal discharge that smells bad. Watch closely for changes in your health, and be sure to contact your doctor if you have any problems. Where can you learn more? Go to http://laith-franklin.info/ Enter M966 in the search box to learn more about \"IUD Removal: Care Instructions. \" 
 Current as of: February 11, 2020               Content Version: 12.5 © 9518-8540 [employer]. Care instructions adapted under license by [employer] (which disclaims liability or warranty for this information). If you have questions about a medical condition or this instruction, always ask your healthcare professional. [address] any warranty or liability for your use of this information.

## 2020-06-17 ENCOUNTER — OFFICE VISIT (OUTPATIENT)
Dept: OBGYN CLINIC | Age: 35
End: 2020-06-17

## 2020-06-17 VITALS
BODY MASS INDEX: 27.79 KG/M2 | DIASTOLIC BLOOD PRESSURE: 70 MMHG | WEIGHT: 151 LBS | SYSTOLIC BLOOD PRESSURE: 112 MMHG | HEIGHT: 62 IN

## 2020-06-17 DIAGNOSIS — N93.9 ABNORMAL UTERINE BLEEDING: ICD-10-CM

## 2020-06-17 DIAGNOSIS — Z30.432 ENCOUNTER FOR IUD REMOVAL: Primary | ICD-10-CM

## 2020-06-17 DIAGNOSIS — Z30.09 FAMILY PLANNING: ICD-10-CM

## 2020-06-17 NOTE — PROGRESS NOTES
[facility]  [email or web address]  [phone]    Nicole Holliday MD, FACOG     IUD REMOVAL  OFFICE PROCEDURE PROGRESS NOTE      Chart reviewed for the following:   I, Kyle Zelaya MD, have reviewed the History, Physical and updated the Allergic reactions for [address] performed immediately prior to start of procedure:   Kaleigh Wiggins MD, have performed the following reviews on Cassidy Rudolph prior to the start of the procedure:            * Patient was identified by name and date of birth   * Agreement on procedure being performed was verified  * Risks and Benefits explained to the patient  * Procedure site verified and marked as necessary  * Patient was positioned for comfort  * Consent was signed and verified     Time: 09:27 am    Date of procedure: 2020    Procedure performed by: Kyle Zelaya MD    Provider assisted by: Sean Samson CMA    Patient assisted by: self    How tolerated by patient: tolerated the procedure well with no complications    Comments: none      Procedure Note: IUD REMOVAL    Cassidy Rudolph is a ,  30 y.o. female [facility] whose No LMP recorded. (Menstrual status: IUD). was on . who presents today for IUD removal. Her current IUD was placed in 2019. She requests removal of the IUD because the IUD because  she wants to get pregnant and AUB. The IUD removal procedure was discussed with the patient and she had no further questions. Recently stopped BF. Procedure: The patient was placed in a dorsal lithotomy position and appropriately draped. A speculum exam was performed and the cervix was visualized. The cervix was prepped with zephiran solution. The IUD string was visualized. Using ring forceps , the string was grasped and the IUD removed intact. The IUD was shown to the patient and discarded. On bimanual exam the uterus was anterior and was normal in size with no tenderness present.     She was given routine post-removal instructions and instructed to notify MD with any questions or concerns. We discussed timed intercourse, menstrual charting, and s/sx of ovulation. I recommended a daily prenatal vitamin. We discussed that if conception does not occur within one year then additional evaluation may be indicated.

## 2021-01-20 ENCOUNTER — TELEPHONE (OUTPATIENT)
Dept: OBGYN CLINIC | Age: 36
End: 2021-01-20

## 2021-01-20 NOTE — TELEPHONE ENCOUNTER
dw pt rba of covid vaccine in pregnancy, ok to complete. Ok to take tylenol. Rec covid vaccine registry. Pt with some cramping requesting serial hcg  Plan   Lab visit: quant hcg   mon/wed.     Dx: pregnancy of unknown location

## 2021-01-20 NOTE — TELEPHONE ENCOUNTER
Patient is scheduled for her NOB visit on 2/26/21. She is nervous because she founded she was pregnant one day after getting the Covid vaccine and is a \"ball of nerves\". Would like to come in for confirmation beta Hcg series. Are you okay with this?

## 2021-01-25 ENCOUNTER — LAB ONLY (OUTPATIENT)
Dept: OBGYN CLINIC | Age: 36
End: 2021-01-25

## 2021-01-25 DIAGNOSIS — O36.80X0 PREGNANCY OF UNKNOWN ANATOMIC LOCATION: Primary | ICD-10-CM

## 2021-01-25 LAB — HCG SERPL-ACNC: ABNORMAL MIU/ML (ref 0–6)

## 2021-01-27 ENCOUNTER — PATIENT MESSAGE (OUTPATIENT)
Dept: OBGYN CLINIC | Age: 36
End: 2021-01-27

## 2021-01-27 ENCOUNTER — LAB ONLY (OUTPATIENT)
Dept: OBGYN CLINIC | Age: 36
End: 2021-01-27

## 2021-01-27 DIAGNOSIS — O36.80X0 PREGNANCY OF UNKNOWN ANATOMIC LOCATION: ICD-10-CM

## 2021-01-28 LAB — HCG SERPL-ACNC: ABNORMAL MIU/ML (ref 0–6)

## 2021-01-28 NOTE — PROGRESS NOTES
The results are normal.   Please notify patient if [employer] message not read or not active. Recommend f/u if still having symptoms/problems or has additional concerns. Confirm eob/us: 2/26?

## 2021-02-01 ENCOUNTER — OFFICE VISIT (OUTPATIENT)
Dept: OBGYN CLINIC | Age: 36
End: 2021-02-01
Payer: COMMERCIAL

## 2021-02-01 ENCOUNTER — TELEPHONE (OUTPATIENT)
Dept: OBGYN CLINIC | Age: 36
End: 2021-02-01

## 2021-02-01 VITALS
HEART RATE: 99 BPM | SYSTOLIC BLOOD PRESSURE: 106 MMHG | BODY MASS INDEX: 26.96 KG/M2 | DIASTOLIC BLOOD PRESSURE: 68 MMHG | WEIGHT: 147.4 LBS

## 2021-02-01 DIAGNOSIS — Z98.890 H/O LEEP: ICD-10-CM

## 2021-02-01 DIAGNOSIS — Z67.90 BLOOD TYPE, RH POSITIVE: ICD-10-CM

## 2021-02-01 DIAGNOSIS — O20.0 THREATENED MISCARRIAGE: Primary | ICD-10-CM

## 2021-02-01 DIAGNOSIS — N93.9 ABNORMAL UTERINE BLEEDING (AUB): ICD-10-CM

## 2021-02-01 PROCEDURE — 99214 OFFICE O/P EST MOD 30 MIN: CPT | Performed by: OBSTETRICS & GYNECOLOGY

## 2021-02-01 PROCEDURE — 76817 TRANSVAGINAL US OBSTETRIC: CPT | Performed by: OBSTETRICS & GYNECOLOGY

## 2021-02-01 RX ORDER — ESCITALOPRAM OXALATE 10 MG/1
10 TABLET ORAL DAILY
COMMUNITY
End: 2021-05-17

## 2021-02-01 NOTE — TELEPHONE ENCOUNTER
Call received at 8:57am      30year old patient reports her LMP was 12/15-12/18  @6w5d pregnant      Patient has first ob visit on 2/26/2021    Patient has been sending my chart messages to MD    January 30, 2021  Bonita Morton MD  to Sandra Anat 2:36 PM  Let's try to see you sooner with an ultrasound to make sure everything is OK.     Do you have a preferred time/day, if we have a choice?     Or call Monday morning after ~830am. [phone].     Gibson Garcia MD       Last read by Alberto Candelaria at 12:33 PM on 1/31/2021. Patient has been having brown to bright red vaginal bleeding off and on with mucous. Patient reports some last night and this am when she wipes. Patient calling to set up earlier appointment to check on the baby    Patient reports cramping at 4 on the pain scale of 1-10     Patient was provided pain and bleeding precautions. Patient was placed on the schedule for 11:00am for ultrasound and then MD at 11:30am    Patient verbalized understanding.
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
## 2021-02-01 NOTE — TELEPHONE ENCOUNTER
Mychart, Generic 1/30/2021 5:50 PM EST    Thank you Dr. Shivam Salamanca. I can make any day and time work. Thanks so much.    ----- Message -----  From: Adama Ochoa MD  Sent: 1/30/21 2:36 PM  To: Evelina Lenz  Subject: RE: Non-Urgent Medical Question    Let's try to see you sooner with an ultrasound to make sure everything is OK. Do you have a preferred time/day, if we have a choice? Or call Monday morning after ~830am. . Adama Ochoa MD        ----- Message -----   From:Elida Mccullough   Sent:1/29/2021 9:31 AM EST   To:Cece Woodard MD   Subject:RE: Non-Urgent Medical Question    Morning,    I'm still having bleeding with mucous discharge almost every time I use the restroom. Is there anything I should be doing? Do I need to be seen? Just concerned because I did not experience this with my previous two pregnancies. Thank you so much for being patient with my worrying.      ----- Message -----   Power Wood MD   Sent:1/28/2021 4:16 PM EST   To:Elida De León   Subject:RE: Non-Urgent [address] if it gets worse/or is not tolerable, let me know. Adama Ochoa MD      ----- Message -----   Suleiman Mccullough   Sent:1/27/2021 3:06 PM EST   Rajesh Terry MD   Subject:RE: Non-Urgent Medical Question    Thanks so much! No fever and no covid exposure. Only spotting that is light. I've been using leftover zofran 8 mg when I start vomiting at work. I'm usually ok by the late afternoon. Cam Moran preggo symptoms :)      ----- Message -----   Power Wood MD   Sent:1/27/2021 2:52 PM EST    To:Elida De León   Subject:RE: Non-Urgent Medical Question    It is not unusual to have these symptoms. Is the bleeding light/spotting? What have you tried for your symptoms so far? Do you have a fever, temp >100? Do you feel like the GI symptoms are pregnancy related or have you had any risk factors for COVID exposure?     Adama Ochoa MD      ----- Message -----   From:Elida Mendez   Sent:1/27/2021 11:46 AM MD [address] Dr.! I've been having vomiting, diarrhea and spotting today. No fever or other symptoms. I know I'm still early and was just looking for reassurance if this is normal or I should be concerned about miscarrying. I had nausea and vomiting throughout my previous 2 pregnancies but not the diarrhea and spotting. Thank you for your help!     Bossman Ga

## 2021-02-03 LAB
C TRACH RRNA SPEC QL NAA+PROBE: NEGATIVE
N GONORRHOEA RRNA SPEC QL NAA+PROBE: NEGATIVE
SPECIMEN STATUS REPORT, ROLRST: NORMAL

## 2021-02-05 LAB — BACTERIA UR CULT: ABNORMAL

## 2021-02-06 NOTE — PROGRESS NOTES
The results are normal.  Please update chart/history/prenatal labs, if needed. [address] message sent if active.   Repeat ur cx with clean catch urine at nv (add to PL/visit notes if needed)

## 2021-02-11 PROBLEM — O09.529 SUPERVISION OF MULTIGRAVIDA OF ADVANCED MATERNAL AGE, ANTEPARTUM: Status: ACTIVE | Noted: 2021-02-11

## 2021-02-11 NOTE — PROGRESS NOTES
Patient notified of urine culture results through mychart. Created prob list, added note to repeat ua culture and also added note to next appt.

## 2021-02-26 ENCOUNTER — ROUTINE PRENATAL (OUTPATIENT)
Dept: OBGYN CLINIC | Age: 36
End: 2021-02-26

## 2021-02-26 VITALS
BODY MASS INDEX: 28.16 KG/M2 | DIASTOLIC BLOOD PRESSURE: 71 MMHG | SYSTOLIC BLOOD PRESSURE: 111 MMHG | WEIGHT: 153 LBS | HEART RATE: 79 BPM | HEIGHT: 62 IN

## 2021-02-26 DIAGNOSIS — O09.529 SUPERVISION OF MULTIGRAVIDA OF ADVANCED MATERNAL AGE, ANTEPARTUM: ICD-10-CM

## 2021-02-26 DIAGNOSIS — O09.529 ANTEPARTUM MULTIGRAVIDA OF ADVANCED MATERNAL AGE: Primary | ICD-10-CM

## 2021-02-26 DIAGNOSIS — Z34.80 SUPERVISION OF OTHER NORMAL PREGNANCY, ANTEPARTUM: ICD-10-CM

## 2021-02-26 LAB
ANTIBODY SCREEN, EXTERNAL: NEGATIVE
CHLAMYDIA, EXTERNAL: NEGATIVE
HBSAG, EXTERNAL: NEGATIVE
HIV, EXTERNAL: NORMAL
N. GONORRHEA, EXTERNAL: NEGATIVE
RPR, EXTERNAL: NORMAL
RUBELLA, EXTERNAL: NORMAL

## 2021-02-26 PROCEDURE — 0502F SUBSEQUENT PRENATAL CARE: CPT | Performed by: OBSTETRICS & GYNECOLOGY

## 2021-02-26 NOTE — PATIENT INSTRUCTIONS
Learning About Pregnancy  Your Care Instructions     Your health in the early weeks of your pregnancy is particularly important for your baby's health. Take good care of yourself. Anything you do that harms your body can also harm your baby. Make sure to go to all of your doctor appointments. Regular checkups will help keep you and your baby healthy. How can you care for yourself at home? Diet    · Eat a balanced diet. Make sure your diet includes plenty of beans, peas, and leafy green vegetables.     · Do not skip meals or go for many hours without eating. If you are nauseated, try to eat a small, healthy snack every 2 to 3 hours.     · Do not eat fish that has a high level of mercury, such as shark, swordfish, or mackerel. Do not eat more than one can of tuna each week.     · Drink plenty of fluids, enough so that your urine is light yellow or clear like water. If you have kidney, heart, or liver disease and have to limit fluids, talk with your doctor before you increase the amount of fluids you drink.     · Cut down on caffeine, such as coffee, tea, and cola.     · Do not drink alcohol, such as beer, wine, or hard liquor.     · Take a multivitamin that contains at least 400 micrograms (mcg) of folic acid to help prevent birth defects. Fortified cereal and whole wheat bread are good additional sources of folic acid.     · Increase the calcium in your diet. Try to drink a quart of skim milk each day. You may also take calcium supplements and choose foods such as cheese and yogurt. Lifestyle    · Make sure you go to your follow-up appointments.     · Get plenty of rest. You may be unusually tired while you are pregnant.     · Get at least 30 minutes of exercise on most days of the week. Walking is a good choice. If you have not exercised in the past, start out slowly. Take several short walks each day.     · Do not smoke. If you need help quitting, talk to your doctor about stop-smoking programs.  These can increase your chances of quitting for good.     · Do not touch cat feces or litter boxes. Also, wash your hands after you handle raw meat, and fully cook all meat before you eat it. Wear gloves when you work in the yard or garden, and wash your hands well when you are done. Cat feces, raw or undercooked meat, and contaminated dirt can cause an infection that may harm your baby or lead to a miscarriage.     · Do not use saunas or hot tubs. Raising your body temperature may harm your baby.     · Avoid chemical fumes, paint fumes, or poisons.     · Do not use illegal drugs or alcohol. Medicines    · Review all of your medicines with your doctor. Some of your routine medicines may need to be changed to protect your baby.     · Use acetaminophen (Tylenol) to relieve minor problems, such as a mild headache or backache or a mild fever with cold symptoms. Do not use nonsteroidal anti-inflammatory drugs (NSAIDs), such as ibuprofen (Advil, Motrin) or naproxen (Aleve), unless your doctor says it is okay.     · Do not take two or more pain medicines at the same time unless the doctor told you to. Many pain medicines have acetaminophen, which is Tylenol. Too much acetaminophen (Tylenol) can be harmful.     · Take your medicines exactly as prescribed. Call your doctor if you think you are having a problem with your medicine. To manage morning sickness    · If you feel sick when you first wake up, try eating a small snack (such as crackers) before you get out of bed. Allow some time to digest the snack, and then get out of bed slowly.     · Do not skip meals or go for long periods without eating. An empty stomach can make nausea worse.     · Eat small, frequent meals instead of three large meals each day.     · Drink plenty of fluids.  Sports drinks, such as Gatorade or Powerade, are good choices.     · Eat foods that are high in protein but low in fat.     · If you are taking iron supplements, ask your doctor if they are necessary. Iron can make nausea worse.     · Avoid any smells, such as coffee, that make you feel sick.     · Get lots of rest. Morning sickness may be worse when you are tired. Follow-up care is a key part of your treatment and safety. Be sure to make and go to all appointments, and call your doctor if you are having problems. It's also a good idea to know your test results and keep a list of the medicines you take. Where can you learn more? Go to http://www.gray.com/  Enter I999 in the search box to learn more about \"Learning About Pregnancy. \"  Current as of: February 11, 2020               Content Version: 12.6  © 9559-5498 [employer]. Care instructions adapted under license by [employer] (which disclaims liability or warranty for this information). If you have questions about a medical condition or this instruction, always ask your healthcare professional. [address] any warranty or liability for your use of this information.
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
## 2021-02-28 LAB
ABO GROUP BLD: NORMAL
BACTERIA UR CULT: NO GROWTH
BLD GP AB SCN SERPL QL: NEGATIVE
ERYTHROCYTE [DISTWIDTH] IN BLOOD BY AUTOMATED COUNT: 12.5 % (ref 11.7–15.4)
HBV SURFACE AG SERPL QL IA: NEGATIVE
HCT VFR BLD AUTO: 37.7 % (ref 34–46.6)
HGB BLD-MCNC: 13 G/DL (ref 11.1–15.9)
HIV 1+2 AB+HIV1 P24 AG SERPL QL IA: NON REACTIVE
MCH RBC QN AUTO: 31.9 PG (ref 26.6–33)
MCHC RBC AUTO-ENTMCNC: 34.5 G/DL (ref 31.5–35.7)
MCV RBC AUTO: 93 FL (ref 79–97)
PLATELET # BLD AUTO: 225 X10E3/UL (ref 150–450)
RBC # BLD AUTO: 4.07 X10E6/UL (ref 3.77–5.28)
RH BLD: POSITIVE
RPR SER QL: NON REACTIVE
RUBV IGG SERPL IA-ACNC: 5.18 INDEX
WBC # BLD AUTO: 7.9 X10E3/UL (ref 3.4–10.8)

## 2021-03-04 LAB
C TRACH RRNA CVX QL NAA+PROBE: NEGATIVE
CYTOLOGIST CVX/VAG CYTO: NORMAL
CYTOLOGY CVX/VAG DOC CYTO: NORMAL
CYTOLOGY CVX/VAG DOC THIN PREP: NORMAL
DX ICD CODE: NORMAL
LABCORP, 190119: NORMAL
Lab: NORMAL
Lab: NORMAL
N GONORRHOEA RRNA CVX QL NAA+PROBE: NEGATIVE
OTHER STN SPEC: NORMAL
STAT OF ADQ CVX/VAG CYTO-IMP: NORMAL

## 2021-03-07 NOTE — PROGRESS NOTES
Normal pap smear, message sent if [address] active. Add yeast to notes on pap  Update PMH/HM: include: Date of pap, Cytology: wnl. For HR HPV results: list NEG or POS, when done. If having symptoms of yeast (dryness, itching, thick vaginal discharge) you can treat it. During pregnancy may use an over the counter 3 or 7 day vaginal yeast treatment. If you do not have any symptoms, yeast does not require treatment.

## 2021-03-08 ENCOUNTER — ROUTINE PRENATAL (OUTPATIENT)
Dept: OBGYN CLINIC | Age: 36
End: 2021-03-08
Payer: COMMERCIAL

## 2021-03-08 VITALS
BODY MASS INDEX: 28.45 KG/M2 | WEIGHT: 154.6 LBS | DIASTOLIC BLOOD PRESSURE: 77 MMHG | SYSTOLIC BLOOD PRESSURE: 108 MMHG | HEIGHT: 62 IN | HEART RATE: 93 BPM

## 2021-03-08 DIAGNOSIS — Z34.80 SUPERVISION OF OTHER NORMAL PREGNANCY, ANTEPARTUM: ICD-10-CM

## 2021-03-08 DIAGNOSIS — R35.0 URINARY FREQUENCY: ICD-10-CM

## 2021-03-08 DIAGNOSIS — R35.0 URINARY FREQUENCY: Primary | ICD-10-CM

## 2021-03-08 DIAGNOSIS — O09.529 ANTEPARTUM MULTIGRAVIDA OF ADVANCED MATERNAL AGE: ICD-10-CM

## 2021-03-08 LAB
BILIRUB UR QL STRIP: NEGATIVE
GLUCOSE UR-MCNC: NEGATIVE MG/DL
KETONES P FAST UR STRIP-MCNC: NEGATIVE MG/DL
NIPT, EXTERNAL: NORMAL
PH UR STRIP: 6.5 [PH] (ref 4.6–8)
PROT UR QL STRIP: NEGATIVE
SP GR UR STRIP: 1.01 (ref 1–1.03)
UA UROBILINOGEN AMB POC: NORMAL (ref 0.2–1)
URINALYSIS CLARITY POC: CLEAR
URINALYSIS COLOR POC: YELLOW
URINALYSIS, EXTERNAL: NORMAL
URINE BLOOD POC: NORMAL
URINE LEUKOCYTES POC: NORMAL
URINE NITRITES POC: NEGATIVE

## 2021-03-08 PROCEDURE — 81003 URINALYSIS AUTO W/O SCOPE: CPT | Performed by: OBSTETRICS & GYNECOLOGY

## 2021-03-08 PROCEDURE — 99212 OFFICE O/P EST SF 10 MIN: CPT | Performed by: OBSTETRICS & GYNECOLOGY

## 2021-03-08 RX ORDER — AMOXICILLIN 500 MG/1
500 CAPSULE ORAL 2 TIMES DAILY
Qty: 14 CAP | Refills: 0 | Status: SHIPPED | OUTPATIENT
Start: 2021-03-08 | End: 2021-03-15
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
## 2021-03-08 NOTE — PROGRESS NOTES
Pt was up most of the night with urinary frequency and burning since last night. Denies cramping and bleeding.

## 2021-03-11 LAB
BACTERIA SPEC CULT: ABNORMAL
BACTERIA SPEC CULT: ABNORMAL
CC UR VC: ABNORMAL
SERVICE CMNT-IMP: ABNORMAL

## 2021-03-14 NOTE — PROGRESS NOTES
GBS positive in URINE, add to prenatal labs and OB problem list.   Notify patient at next visit and give GBS handout.

## 2021-03-16 ENCOUNTER — ROUTINE PRENATAL (OUTPATIENT)
Dept: OBGYN CLINIC | Age: 36
End: 2021-03-16
Payer: COMMERCIAL

## 2021-03-16 ENCOUNTER — HOSPITAL ENCOUNTER (OUTPATIENT)
Dept: PERINATAL CARE | Age: 36
Discharge: HOME | End: 2021-03-16
Attending: OBSTETRICS & GYNECOLOGY
Payer: COMMERCIAL

## 2021-03-16 VITALS
DIASTOLIC BLOOD PRESSURE: 74 MMHG | SYSTOLIC BLOOD PRESSURE: 108 MMHG | HEART RATE: 85 BPM | HEIGHT: 62 IN | WEIGHT: 157.8 LBS | BODY MASS INDEX: 29.04 KG/M2

## 2021-03-16 DIAGNOSIS — Z98.890 H/O LEEP: ICD-10-CM

## 2021-03-16 DIAGNOSIS — Z3A.13 13 WEEKS GESTATION OF PREGNANCY: ICD-10-CM

## 2021-03-16 DIAGNOSIS — O09.529 SUPERVISION OF MULTIGRAVIDA OF ADVANCED MATERNAL AGE, ANTEPARTUM: ICD-10-CM

## 2021-03-16 DIAGNOSIS — O09.529 ANTEPARTUM MULTIGRAVIDA OF ADVANCED MATERNAL AGE: Primary | ICD-10-CM

## 2021-03-16 PROCEDURE — 0502F SUBSEQUENT PRENATAL CARE: CPT | Performed by: OBSTETRICS & GYNECOLOGY

## 2021-03-16 PROCEDURE — 76813 OB US NUCHAL MEAS 1 GEST: CPT | Performed by: OBSTETRICS & GYNECOLOGY

## 2021-03-16 NOTE — PROGRESS NOTES
_ [address] [facility]  [email or web address]  [phone]    Valeda Dubin, MD, FACOG     Follow-up OB visit    Chief Complaint   Patient presents with    Routine Prenatal Visit       Patient Active Problem List    Diagnosis Date Noted    Supervision of multigravida of advanced maternal age, antepartum 2021    HSV-2 infection 2018    Anxiety 2018    Hx of  section 2018          The patient reports the following concerns: MFM today at 1pm.   Nausea improving, emesis resolved     Vitals:    21 1008   BP: 108/74   Pulse: 85   Weight: 157 lb 12.8 oz (71.6 kg)   Height: 5' 2\" (1.575 m)     See PN flowsheet for exam    29 y.o.  13w0d   Encounter Diagnoses   Name Primary?  Antepartum multigravida of advanced maternal age Yes    H/O LEEP     13 weeks gestation of pregnancy      NT, FS  Fu nips   [] SAB/bleeding precautions reviewed   [] PTL/PPROM precautions reviewed   [] Labor precautions reviewed   [] Fetal kick counts discussed   [] Labs reviewed with patient   [] Fredia Mortimer precautions reviewed   [] Consent reviewed   [] Handouts given to pt   [] Glucola handout    [] GBS/labor/Magic Hour handout   []    [] We reviewed CDC recommendations for Tdap for patient and close contacts and RBA of receiving in pregnancy, advised obtaining in third trimester   [] Reviewed healthy nutrition in pregnancy and good exercise practices   [] We disc safer medications in pregnancy and referred patient to [place] [employer] resources   [] We reviewed CDC recommendations for flu vaccine and RBA of receiving in pregnancy   []    []    []       Follow-up and Dispositions    · Return in about 4 weeks (around 2021) for fob. No orders of the defined types were placed in this encounter.       Valeda Dubin, MD

## 2021-03-16 NOTE — PATIENT INSTRUCTIONS
Weeks 10 to 14 of Your Pregnancy: Care Instructions Your Care Instructions By weeks 10 to 15 of your pregnancy, the placenta has formed inside your uterus. It is possible to hear your baby's heartbeat with a special ultrasound device. Your baby's eyes can and do move. The arms and legs can bend. This is a good time to think about testing for birth defects. There are two types of tests: screening and diagnostic. Screening tests show the chance that a baby has a certain birth defect. They can't tell you for sure that your baby has a problem. Diagnostic tests show if a baby has a certain birth defect. It's your choice whether to have these tests. You and your partner can talk to your doctor or midwife about birth defects tests. Follow-up care is a key part of your treatment and safety. Be sure to make and go to all appointments, and call your doctor if you are having problems. It's also a good idea to know your test results and keep a list of the medicines you take. How can you care for yourself at home? Decide about tests · You can have screening tests and diagnostic tests to check for birth defects. The decision to have a test for birth defects is personal. Think about your age, your chance of passing on a family disease, your need to know about any problems, and what you might do after you have the test results. ? Triple or quadruple (quad) blood tests. These screening tests can be done between 15 and 20 weeks of pregnancy. They check the amounts of three or four substances in your blood. The doctor looks at these test results, along with your age and other factors, to find out the chance that your baby may have certain problems. ? Amniocentesis. This diagnostic test is used to look for chromosomal problems in the baby's cells.  It can be done between 15 and 20 weeks of pregnancy, usually around week 16. 
? Nuchal translucency test. This test uses ultrasound to measure the thickness of the area at the back of the baby's neck. An increase in the thickness can be an early sign of Down syndrome. ? Chorionic villus sampling (CVS). This is a test that looks for certain genetic problems with your baby. The same genes that are in your baby are in the placenta. A small piece of the placenta is taken out and tested. This test is done when you are 10 to 13 weeks pregnant. Ease discomfort · Slow down and take naps when you feel tired. · If your emotions swing, talk to someone. Crying, anxiety, and concentration problems are common. · If your gums bleed, try a softer toothbrush. If your gums are puffy and bleed a lot, see your dentist. 
· If you feel dizzy: ? Get up slowly after sitting or lying down. ? Drink plenty of fluids. ? Eat small snacks to keep your blood sugar stable. ? Put your head between your legs as though you were tying your shoelaces. ? Lie down with your legs higher than your head. Use pillows to prop up your feet. · If you have a headache: ? Lie down. ? Ask your partner or a good friend for a neck massage. ? Try cool cloths over your forehead or across the back of your neck. ? Use acetaminophen (Tylenol) for pain relief. Do not use nonsteroidal anti-inflammatory drugs (NSAIDs), such as ibuprofen (Advil, Motrin) or naproxen (Aleve), unless your doctor says it is okay. · If you have a nosebleed, pinch your nose gently, and hold it for a short while. To prevent nosebleeds, try massaging a small dab of petroleum jelly, such as Vaseline, in your nostrils. · If your nose is stuffed up, try saline (saltwater) nose sprays. Do not use decongestant sprays. Care for your breasts · Wear a bra that gives you good support. · Know that changes in your breasts are normal. 
? Your breasts may get larger and more tender. Tenderness usually gets better by 12 weeks. ? Your nipples may get darker and larger, and small bumps around your nipples may show more. ?  The veins in your chest and breasts may show more. · Don't worry about \"toughening'\" your nipples. Breastfeeding will naturally do this. Where can you learn more? Go to http://www.gray.com/ Enter Z975 in the search box to learn more about \"Weeks 10 to 14 of Your Pregnancy: Care Instructions. \" Current as of: February 11, 2020               Content Version: 12.6 © 3224-3691 [employer]. Care instructions adapted under license by [employer] (which disclaims liability or warranty for this information). If you have questions about a medical condition or this instruction, always ask your healthcare professional. [address] any warranty or liability for your use of this information.

## 2021-03-17 NOTE — PROGRESS NOTES
NIPS low risk  Notify pt if [employer] message not read or not active. Notify pt of gender, if desired.   Add to PL: NIPS- low risk, add gender if patient find out (XX or XY: see report)  Update PNL  Confirm 20 wk US scheduled: add date and location ([facility])  to PL

## 2021-03-19 NOTE — PROGRESS NOTES
MFM US/NT-ultrasound component normal.  Update prenatals. Confirm NIPS/1st trimester serum results in chart.   Confirm MFM 20 wk FS, coordinate with GINO appt if possible (GEOFFREY)

## 2021-04-13 NOTE — PATIENT INSTRUCTIONS

## 2021-04-14 ENCOUNTER — ROUTINE PRENATAL (OUTPATIENT)
Dept: OBGYN CLINIC | Age: 36
End: 2021-04-14
Payer: COMMERCIAL

## 2021-04-14 VITALS
SYSTOLIC BLOOD PRESSURE: 118 MMHG | WEIGHT: 163.6 LBS | HEART RATE: 100 BPM | HEIGHT: 62 IN | DIASTOLIC BLOOD PRESSURE: 83 MMHG | BODY MASS INDEX: 30.11 KG/M2

## 2021-04-14 DIAGNOSIS — O09.529 ANTEPARTUM MULTIGRAVIDA OF ADVANCED MATERNAL AGE: Primary | ICD-10-CM

## 2021-04-14 DIAGNOSIS — Z3A.17 17 WEEKS GESTATION OF PREGNANCY: ICD-10-CM

## 2021-04-14 LAB — AFP, MATERNAL, EXTERNAL: NEGATIVE

## 2021-04-14 PROCEDURE — 0502F SUBSEQUENT PRENATAL CARE: CPT | Performed by: OBSTETRICS & GYNECOLOGY

## 2021-04-14 RX ORDER — BUPROPION HYDROCHLORIDE 300 MG/1
TABLET ORAL
COMMUNITY
Start: 2021-04-02 | End: 2021-05-17

## 2021-04-14 RX ORDER — ONDANSETRON 4 MG/1
4 TABLET, ORALLY DISINTEGRATING ORAL
Qty: 30 TAB | Refills: 0 | Status: SHIPPED | OUTPATIENT
Start: 2021-04-14 | End: 2021-07-30

## 2021-04-14 NOTE — PROGRESS NOTES
_ [facility]  [email or web address]  [phone]    Keren Lentz MD, FACOG     Follow-up OB visit    Chief Complaint   Patient presents with    Routine Prenatal Visit   fob    Patient Active Problem List    Diagnosis Date Noted    Supervision of multigravida of advanced maternal age, antepartum 2021    HSV-2 infection 2018    Anxiety 2018    Hx of  section 2018          The patient reports the following concerns: patient reports feeling \"hot, sweaty and sick to my stomach, also really dizzy. It happened twice this week. \" Patient reports fainting one of those times. Vitals:    21 1619   BP: 118/83   Pulse: 100   Weight: 163 lb 9.6 oz (74.2 kg)   Height: 5' 2\" (1.575 m)     See PN flowsheet for exam    29 y.o.  17w1d   Encounter Diagnoses   Name Primary?  Antepartum multigravida of advanced maternal age Yes    17 weeks gestation of pregnancy          [] SAB/bleeding precautions reviewed   [] PTL/PPROM precautions reviewed   [] Labor precautions reviewed   [] Fetal kick counts discussed   [] Labs reviewed with patient   [] Elvira Buggy precautions reviewed   [] Consent reviewed   [] Handouts given to pt   [] Glucola handout    [] GBS/labor/Magic Hour handout   []    [] We reviewed CDC recommendations for Tdap for patient and close contacts and RBA of receiving in pregnancy, advised obtaining in third trimester   [] Reviewed healthy nutrition in pregnancy and good exercise practices   [] We disc safer medications in pregnancy and referred patient to [facility] resources   [] We reviewed CDC recommendations for flu vaccine and RBA of receiving in pregnancy   []    []    []       Follow-up and Dispositions    · Return in about 3 weeks (around 2021) for routine prenal care & U/S.          Orders Placed This Encounter    AFP, MATERNAL SCREEN    ondansetron (ZOFRAN ODT) 4 mg disintegrating tablet       Keren Lentz MD'

## 2021-04-16 LAB
AFP ADJ MOM SERPL: 0.94
AFP INTERP SERPL-IMP: NORMAL
AFP INTERP SERPL-IMP: NORMAL
AFP SERPL-MCNC: 35.6 NG/ML
AGE AT DELIVERY: 35.6 YR
COMMENT, 018013: NORMAL
GA METHOD: NORMAL
GA: 17.1 WEEKS
IDDM PATIENT QL: NO
MULTIPLE PREGNANCY: NO
NEURAL TUBE DEFECT RISK FETUS: NORMAL %
RESULTS, 017004: NORMAL

## 2021-04-16 NOTE — PROGRESS NOTES
Normal results, add to prenatal records. We can review in detail with patient at next visit. [facility] message sent if active.

## 2021-05-05 ENCOUNTER — HOSPITAL ENCOUNTER (OUTPATIENT)
Dept: PERINATAL CARE | Age: 36
Discharge: HOME | End: 2021-05-05
Attending: OBSTETRICS & GYNECOLOGY
Payer: COMMERCIAL

## 2021-05-05 PROCEDURE — 76811 OB US DETAILED SNGL FETUS: CPT | Performed by: OBSTETRICS & GYNECOLOGY

## 2021-05-17 ENCOUNTER — ROUTINE PRENATAL (OUTPATIENT)
Dept: OBGYN CLINIC | Age: 36
End: 2021-05-17
Payer: COMMERCIAL

## 2021-05-17 VITALS
HEART RATE: 115 BPM | WEIGHT: 171 LBS | BODY MASS INDEX: 31.28 KG/M2 | DIASTOLIC BLOOD PRESSURE: 74 MMHG | SYSTOLIC BLOOD PRESSURE: 107 MMHG

## 2021-05-17 DIAGNOSIS — O09.529 SUPERVISION OF MULTIGRAVIDA OF ADVANCED MATERNAL AGE, ANTEPARTUM: Primary | ICD-10-CM

## 2021-05-17 DIAGNOSIS — F32.A DEPRESSION, UNSPECIFIED DEPRESSION TYPE: ICD-10-CM

## 2021-05-17 DIAGNOSIS — Z3A.21 21 WEEKS GESTATION OF PREGNANCY: ICD-10-CM

## 2021-05-17 PROCEDURE — 0502F SUBSEQUENT PRENATAL CARE: CPT | Performed by: OBSTETRICS & GYNECOLOGY

## 2021-05-17 RX ORDER — ESCITALOPRAM OXALATE 20 MG/1
20 TABLET ORAL DAILY
Qty: 30 TAB | Refills: 0 | Status: SHIPPED | OUTPATIENT
Start: 2021-05-17 | End: 2021-06-08

## 2021-05-17 RX ORDER — BUPROPION HYDROCHLORIDE 150 MG/1
150 TABLET, EXTENDED RELEASE ORAL 2 TIMES DAILY
Qty: 60 TAB | Refills: 0 | Status: SHIPPED | OUTPATIENT
Start: 2021-05-17 | End: 2021-05-25 | Stop reason: DRUGHIGH

## 2021-05-17 NOTE — LETTER
5/17/2021 5:24 PM 
 
Ms. Ronak Villanueva [address] [id number] To Whom It May Concern, Please excuse Crystal Candace from work starting 5/17/2021 until 5/25/2021 due to pregnancy complications. On 5/25/2021 she will be reevaluated. If there are any questions or concerns please have Crystal Maria contact our office. Sincerely, 
 
Arleen Leal MD
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
## 2021-05-25 ENCOUNTER — ROUTINE PRENATAL (OUTPATIENT)
Dept: OBGYN CLINIC | Age: 36
End: 2021-05-25
Payer: COMMERCIAL

## 2021-05-25 VITALS
BODY MASS INDEX: 31.46 KG/M2 | HEART RATE: 105 BPM | DIASTOLIC BLOOD PRESSURE: 79 MMHG | WEIGHT: 172 LBS | SYSTOLIC BLOOD PRESSURE: 124 MMHG

## 2021-05-25 DIAGNOSIS — Z98.890 H/O LEEP: ICD-10-CM

## 2021-05-25 DIAGNOSIS — O09.529 SUPERVISION OF MULTIGRAVIDA OF ADVANCED MATERNAL AGE, ANTEPARTUM: ICD-10-CM

## 2021-05-25 DIAGNOSIS — Z98.891 H/O: C-SECTION: ICD-10-CM

## 2021-05-25 DIAGNOSIS — O34.219 HX SUCCESSFUL VBAC (VAGINAL BIRTH AFTER CESAREAN), CURRENTLY PREGNANT: ICD-10-CM

## 2021-05-25 DIAGNOSIS — F41.9 ANXIETY: ICD-10-CM

## 2021-05-25 DIAGNOSIS — F32.A DEPRESSION, UNSPECIFIED DEPRESSION TYPE: Primary | ICD-10-CM

## 2021-05-25 PROCEDURE — 99213 OFFICE O/P EST LOW 20 MIN: CPT | Performed by: OBSTETRICS & GYNECOLOGY

## 2021-05-25 RX ORDER — SERTRALINE HYDROCHLORIDE 50 MG/1
TABLET, FILM COATED ORAL
COMMUNITY
Start: 2021-05-21 | End: 2021-07-30

## 2021-05-25 RX ORDER — BUPROPION HYDROCHLORIDE 200 MG/1
TABLET, EXTENDED RELEASE ORAL
COMMUNITY
Start: 2021-05-21 | End: 2021-07-30
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
## 2021-05-25 NOTE — LETTER
5/25/2021 2:21 PM 
 
Ms. Lilli Jenkins [address] [place] [address] To Whom It May Concern,  
 
Lilli Jenkins is currently under the care of [facility]. Due to complications of pregnancy, Erma Juares should not exceed an eight (8) hour work day. It is also recommended she have access to lots of water,  she not lift anything greater than 20 lbs and be allowed to sit during her workday. If you have any other questions/ concerns please have the patient contact the office at [phone]. Sincerely, Charline Shah LPN for Christiano Jain MD

## 2021-05-26 PROBLEM — Z98.890 H/O LEEP: Status: ACTIVE | Noted: 2021-05-26

## 2021-05-26 PROBLEM — Z98.891 H/O: C-SECTION: Status: ACTIVE | Noted: 2021-05-26

## 2021-05-26 PROBLEM — O34.219 HX SUCCESSFUL VBAC (VAGINAL BIRTH AFTER CESAREAN), CURRENTLY PREGNANT: Status: ACTIVE | Noted: 2021-05-26

## 2021-06-08 ENCOUNTER — ROUTINE PRENATAL (OUTPATIENT)
Dept: OBGYN CLINIC | Age: 36
End: 2021-06-08
Payer: COMMERCIAL

## 2021-06-08 VITALS
SYSTOLIC BLOOD PRESSURE: 123 MMHG | DIASTOLIC BLOOD PRESSURE: 77 MMHG | HEART RATE: 113 BPM | WEIGHT: 176 LBS | BODY MASS INDEX: 32.19 KG/M2

## 2021-06-08 DIAGNOSIS — O09.529 SUPERVISION OF MULTIGRAVIDA OF ADVANCED MATERNAL AGE, ANTEPARTUM: Primary | ICD-10-CM

## 2021-06-08 PROCEDURE — 0502F SUBSEQUENT PRENATAL CARE: CPT | Performed by: OBSTETRICS & GYNECOLOGY

## 2021-06-08 NOTE — PROGRESS NOTES
[facility]  [email or web address]  [phone]    Caren Parr MD, FACOG     Follow-up OB visit    Chief Complaint   Patient presents with   [facility] Routine Prenatal Visit       Patient Active Problem List    Diagnosis Date Noted    Hx successful  (vaginal birth after ), currently pregnant 2021    H/O:  2021    H/O LEEP 2021    Depression 2021    Supervision of multigravida of advanced maternal age, antepartum 2021    HSV-2 infection 2018    Anxiety 2018    Hx of  section 2018          The patient reports the following concerns: Patient reports on and off cramping since . Patient also reports increased bowel movements today. Vitals:    21 1425   BP: 123/77   Pulse: (!) 113   Weight: 176 lb (79.8 kg)     See PN flowsheet for exam  Feeling much better  More frequent Bm today, no sick contacts/f/c    29 y.o.  25w0d   Encounter Diagnosis   Name Primary?     Supervision of multigravida of advanced maternal age, antepartum Yes     Bowel regimen/infection precautions  Keep psych/counselor fu   [] SAB/bleeding precautions reviewed   [] PTL/PPROM precautions reviewed   [] Labor precautions reviewed   [] Fetal kick counts discussed   [] Labs reviewed with patient   [] [place] precautions reviewed   [] Consent reviewed   [] Handouts given to pt   [] Glucola handout    [] GBS/labor/Magic Hour handout   []    [] We reviewed CDC recommendations for Tdap for patient and close contacts and RBA of receiving in pregnancy, advised obtaining in third trimester   [] Reviewed healthy nutrition in pregnancy and good exercise practices   [] We disc safer medications in pregnancy and referred patient to [facility] GINO resources   [] We reviewed CDC recommendations for flu vaccine and RBA of receiving in pregnancy   []    []    []       Follow-up and Dispositions     Follow-up and Disposition History          No orders of the defined types were placed in this encounter.       Ning Torres MD

## 2021-06-21 NOTE — PATIENT INSTRUCTIONS
Weeks 26 to 30 of Your Pregnancy: Care Instructions  Overview     You are now entering your last trimester of pregnancy. Your baby is growing quickly. Pee Delgadillo probably feel your baby moving around more often. Your doctor may ask you to count your baby's kicks. Your back may ache as your body gets used to your baby's size and length. If you haven't already had the Tdap shot during this pregnancy, talk to your doctor about getting it. It will help protect your  against pertussis infection. During this time, it's important to take care of yourself and pay attention to what your body needs. If you feel sexual, you can explore ways to be close with your partner that match your comfort and desire. Follow-up care is a key part of your treatment and safety. Be sure to make and go to all appointments, and call your doctor if you are having problems. It's also a good idea to know your test results and keep a list of the medicines you take. How can you care for yourself at home? Take it easy at work  · Take frequent breaks. If possible, stop working when you are tired, and rest during your lunch hour. · Take bathroom breaks every 2 hours. · Change positions often. If you sit for long periods, stand up and walk around. · When you stand for a long time, keep one foot on a low stool with your knee bent. After standing a lot, sit with your feet up. · Avoid fumes, chemicals, and tobacco smoke. Be sexual in your own way  · Having sex during pregnancy is okay, unless your doctor tells you not to. · You may be very interested in sex, or you may have no interest at all. · Your growing belly can make it hard to find a good position during intercourse. Auberry and explore. · You may get cramps in your uterus when your partner touches your breasts. · A back rub may relieve the backache or cramps that sometimes follow orgasm. Learn about  labor  · Watch for signs of  labor.  You may be going into labor if:  ? You have menstrual-like cramps, with or without nausea. ? You have about 6 or more contractions in 1 hour, even after you have had a glass of water and are resting. ? You have a low, dull backache that does not go away when you change your position. ? You have pain or pressure in your pelvis that comes and goes in a pattern. ? You have intestinal cramping or flu-like symptoms, with or without diarrhea.  ? You notice an increase or change in your vaginal discharge. Discharge may be heavy, mucus-like, watery, or streaked with blood. ? Your water breaks. · If you think you have  labor:  ? Drink 2 or 3 glasses of water or juice. Not drinking enough fluids can cause contractions. ? Stop what you are doing, and empty your bladder. Then lie down on your left side for at least 1 hour. ? While lying on your side, find your breast bone. Put your fingers in the soft spot just below it. Move your fingers down toward your belly button to find the top of your uterus. Check to see if it is tight. ? Contractions can be weak or strong. Record your contractions for an hour. Time a contraction from the start of one contraction to the start of the next one.  ? Single or several strong contractions without a pattern are called Kingsley-Mata contractions. They are practice contractions but not the start of labor. They often stop if you change what you are doing. ? Call your doctor if you have regular contractions. Where can you learn more? Go to http://www.gray.com/  Enter X804 in the search box to learn more about \"Weeks 26 to 30 of Your Pregnancy: Care Instructions. \"  Current as of: 2020               Content Version: 12.8  © 5529-0368 [employer]. Care instructions adapted under license by [employer] (which disclaims liability or warranty for this information).  If you have questions about a medical condition or this instruction, always ask your healthcare professional. [address] any warranty or liability for your use of this information. Vaccine Information Statement    Tdap (Tetanus, Diphtheria, Pertussis) Vaccine: What you need to know     Many Vaccine Information Statements are available in Uzbek and other languages. See www.immunize.org/vis  Hojas de información sobre vacunas están disponibles en español y en muchos otros idiomas. Visite www.immunize.org/vis    1. Why get vaccinated? Tdap vaccine can prevent tetanus, diphtheria, and pertussis. Diphtheria and pertussis spread from person to person. Tetanus enters the body through cuts or wounds.  TETANUS (T) causes painful stiffening of the muscles. Tetanus can lead to serious health problems, including being unable to open the mouth, having trouble swallowing and breathing, or death.  DIPHTHERIA (D) can lead to difficulty breathing, heart failure, paralysis, or death.  PERTUSSIS (aP), also known as whooping cough, can cause uncontrollable, violent coughing which makes it hard to breathe, eat, or drink. Pertussis can be extremely serious in babies and young children, causing pneumonia, convulsions, brain damage, or death. In teens and adults, it can cause weight loss, loss of bladder control, passing out, and rib fractures from severe coughing. 2. Tdap vaccine     Tdap is only for children 7 years and older, adolescents, and adults. Adolescents should receive a single dose of Tdap, preferably at age 6 or 15 years. Pregnant women should get a dose of Tdap during every pregnancy, to protect the  from pertussis. Infants are most at risk for severe, life-threatening complications from pertussis. Adults who have never received Tdap should get a dose of Tdap. Also, adults should receive a booster dose every 10 years, or earlier in the case of a severe and dirty wound or burn.   Booster doses can be either Tdap or Td (a different vaccine that protects against tetanus and diphtheria but not pertussis). Tdap may be given at the same time as other vaccines. 3. Talk with your health care provider    Tell your vaccine provider if the person getting the vaccine:   Has had an allergic reaction after a previous dose of any vaccine that protects against tetanus, diphtheria, or pertussis, or has any severe, life-threatening allergies.  Has had a coma, decreased level of consciousness, or prolonged seizures within 7 days after a previous dose of any pertussis vaccine (DTP, DTaP, or Tdap).  Has seizures or another nervous system problem.  Has ever had Guillain-Barré Syndrome (also called GBS).  Has had severe pain or swelling after a previous dose of any vaccine that protects against tetanus or diphtheria. In some cases, your health care provider may decide to postpone Tdap vaccination to a future visit. People with minor illnesses, such as a cold, may be vaccinated. People who are moderately or severely ill should usually wait until they recover before getting Tdap vaccine. Your health care provider can give you more information. 4. Risks of a vaccine reaction     Pain, redness, or swelling where the shot was given, mild fever, headache, feeling tired, and nausea, vomiting, diarrhea, or stomachache sometimes happen after Tdap vaccine. People sometimes faint after medical procedures, including vaccination. Tell your provider if you feel dizzy or have vision changes or ringing in the ears. As with any medicine, there is a very remote chance of a vaccine causing a severe allergic reaction, other serious injury, or death. 5. What if there is a serious problem? An allergic reaction could occur after the vaccinated person leaves the clinic.  If you see signs of a severe allergic reaction (hives, swelling of the face and throat, difficulty breathing, a fast heartbeat, dizziness, or weakness), call 9-1-1 and get the person to the nearest hospital.    For other signs that concern you, call your health care provider. Adverse reactions should be reported to the Vaccine Adverse Event Reporting System (VAERS). Your health care provider will usually file this report, or you can do it yourself. Visit the VAERS website at www.vaers. hhs.gov or call 7-278.343.1639. VAERS is only for reporting reactions, and VAERS staff do not give medical advice. 6. The National Vaccine Injury Compensation Program    The [facility] Vaccine Injury Compensation Program (VICP) is a federal program that was created to compensate people who may have been injured by certain vaccines. Visit the VICP website at www.[facility].gov/vaccinecompensation or call 8-890.999.8391 to learn about the program and about filing a claim. There is a time limit to file a claim for compensation. 7. How can I learn more?  Ask your health care provider.  Call your local or state health department.  Contact the Centers for Disease Control and Prevention (CDC):  - Call 5-446.250.2536 (1-800-CDC-INFO) or  - Visit CDCs website at www.cdc.gov/vaccines    Vaccine Information Statement (Interim)  Tdap (Tetanus, Diphtheria, Pertussis) Vaccine  04/01/2020  [address] [id number]-[id number]   Department of Health and Human Services  Centers for Disease Control and Prevention    Office Use Only

## 2021-06-21 NOTE — PROGRESS NOTES
Bernarda Herndon is a 29 y.o. female who presents for routine immunizations. She denies any symptoms , reactions or allergies that would exclude them from being immunized today. Risks and adverse reactions were discussed and the VIS was given to them. All questions were addressed. She was observed for 10 min post injection. There were no reactions observed.     Annette Krishnan LPN

## 2021-06-22 ENCOUNTER — ROUTINE PRENATAL (OUTPATIENT)
Dept: OBGYN CLINIC | Age: 36
End: 2021-06-22
Payer: COMMERCIAL

## 2021-06-22 VITALS
BODY MASS INDEX: 32.74 KG/M2 | DIASTOLIC BLOOD PRESSURE: 79 MMHG | SYSTOLIC BLOOD PRESSURE: 125 MMHG | HEART RATE: 120 BPM | WEIGHT: 179 LBS

## 2021-06-22 DIAGNOSIS — Z23 ENCOUNTER FOR IMMUNIZATION: ICD-10-CM

## 2021-06-22 DIAGNOSIS — O09.529 SUPERVISION OF MULTIGRAVIDA OF ADVANCED MATERNAL AGE, ANTEPARTUM: Primary | ICD-10-CM

## 2021-06-22 LAB
25(OH)D3 SERPL-MCNC: 27.3 NG/ML (ref 30–100)
ERYTHROCYTE [DISTWIDTH] IN BLOOD BY AUTOMATED COUNT: 12.9 % (ref 11.5–14.5)
GLUCOSE 1H P 100 G GLC PO SERPL-MCNC: 106 MG/DL (ref 65–140)
GTT, 1 HR, GLUCOLA, EXTERNAL: 106
HCT VFR BLD AUTO: 32.7 % (ref 35–47)
HGB BLD-MCNC: 10.3 G/DL (ref 11.5–16)
IRON SATN MFR SERPL: 6 % (ref 20–50)
IRON SERPL-MCNC: 34 UG/DL (ref 35–150)
MCH RBC QN AUTO: 28.3 PG (ref 26–34)
MCHC RBC AUTO-ENTMCNC: 31.5 G/DL (ref 30–36.5)
MCV RBC AUTO: 89.8 FL (ref 80–99)
NRBC # BLD: 0 K/UL (ref 0–0.01)
NRBC BLD-RTO: 0 PER 100 WBC
PLATELET # BLD AUTO: 183 K/UL (ref 150–400)
PMV BLD AUTO: 10.9 FL (ref 8.9–12.9)
RBC # BLD AUTO: 3.64 M/UL (ref 3.8–5.2)
TIBC SERPL-MCNC: 575 UG/DL (ref 250–450)
TSH SERPL DL<=0.05 MIU/L-ACNC: 1.54 UIU/ML (ref 0.36–3.74)
WBC # BLD AUTO: 7.1 K/UL (ref 3.6–11)

## 2021-06-22 PROCEDURE — 90715 TDAP VACCINE 7 YRS/> IM: CPT | Performed by: OBSTETRICS & GYNECOLOGY

## 2021-06-22 PROCEDURE — 0502F SUBSEQUENT PRENATAL CARE: CPT | Performed by: OBSTETRICS & GYNECOLOGY

## 2021-06-22 PROCEDURE — 90471 IMMUNIZATION ADMIN: CPT | Performed by: OBSTETRICS & GYNECOLOGY

## 2021-06-22 NOTE — PROGRESS NOTES
_ [address] [facility]  [email or web address]  [phone]    Annette Ortiz MD, FACOG     Follow-up OB visit    Chief Complaint   Patient presents with    Routine Prenatal Visit       Patient Active Problem List    Diagnosis Date Noted    Hx successful  (vaginal birth after ), currently pregnant 2021    H/O:  2021    H/O LEEP 2021    Depression 2021    Supervision of multigravida of advanced maternal age, antepartum 2021    HSV-2 infection 2018    Anxiety 2018    Hx of  section 2018          The patient reports the following concerns: Patient reports that she is getting her iron, vitamin D and thyroid drawn today through blood work. Patient also reports that she is feeling luis carlos hick's contractions earlier than in her last pregnancy. Vitals:    21 0940   BP: 125/79   Pulse: (!) 120   Weight: 179 lb (81.2 kg)     See PN flowsheet for exam    29 y.o.  27w0d   Encounter Diagnoses   Name Primary?  Encounter for immunization     Supervision of multigravida of advanced maternal age, antepartum Yes     We reviewed the GINO delivery consent and it was signed by the patient. We discussed risks and alternatives to trial of labor after  section, including elective repeat  section, ERCD. We also discussed potential risks of pitocin use, including but not limited to uterine rupture. We discussed bleeding, infection, anesthesia risks, damage to internal organs; bladder/bowel/other internal organs, scarring, and additional procedures, if needed. We discussed that blood transfusion may be required in life threatening situations and discussed alternatives. We reviewed potential risks of blood transfusion: including transfusion reactions and infections. The patient consents to transfusion, if necessary.     We reviewed success and failure rates of TOLAC, incidence of uterine rupture and factors that increase risks, future reproductive plans and risks of multiple  deliveries, including but not limited to placenta creta. The maternal and  risks with uterine rupture were reviewed. We discussed  risks associated with TOLAC and ERCD. The risk of peripartum hysterectomy because of uterine rupture or placenta creta, in future pregnancies were reviewed. Labs  Disc tdap for family       [] SAB/bleeding precautions reviewed   [] PTL/PPROM precautions reviewed   [] Labor precautions reviewed   [] Fetal kick counts discussed   [] Labs reviewed with patient   [] Milta Guero precautions reviewed   [] Consent reviewed   [] Handouts given to pt   [] Glucola handout    [] GBS/labor/Magic Hour handout   []    [] We reviewed CDC recommendations for Tdap for patient and close contacts and RBA of receiving in pregnancy, advised obtaining in third trimester   [] Reviewed healthy nutrition in pregnancy and good exercise practices   [] We disc safer medications in pregnancy and referred patient to [facility] GINO resources   [] We reviewed CDC recommendations for flu vaccine and RBA of receiving in pregnancy   []    []    []       Follow-up and Dispositions    · Return in about 2 weeks (around 2021) for Follow up OB visit.          Orders Placed This Encounter    Tetanus, diphtheria toxoids and acellular pertussis (TDAP) vaccine, in individuals >=7 years, IM    CBC W/O DIFF    GLUCOSE, GESTATIONAL 1 HR TOLERANCE    VITAMIN D, 25 HYDROXY    IRON PROFILE    TSH 3RD GENERATION    OK IMMUNIZ ADMIN,1 SINGLE/COMB VAC/TOXOID       Clarice Vicente MD

## 2021-07-03 NOTE — PROGRESS NOTES
Abnormal results. vivit message sent, if active. Notify patient, if vivit message not read, or not active on [address]. Update chart, PN labs/problem list, if needed  Anemia; rec iron per protocol  Add vit d, tsh and hgb # and date to PL  See pt message for more detail.   Update pnl prn

## 2021-07-14 NOTE — PATIENT INSTRUCTIONS
Weeks 30 to 32 of Your Pregnancy: Care Instructions  Overview     You've made it to the final months of your pregnancy! By now your baby is really starting to look like a baby, with hair and plump skin. As you enter the final weeks of pregnancy, the reality of having a baby may start to set in. This is a good time to set up a safe nursery and find quality  if needed. Doing this stuff ahead of time will allow you to focus on caring for and enjoying your new baby. You may also want to take a tour of your hospital's labor and delivery unit. This will help you get a better idea of what to expect while you're in the hospital.  During these last months, be sure to take good care of yourself. Pay attention to what your body needs. If your doctor says it's okay for you to work, don't push yourself too hard. If you haven't already had the Tdap shot during this pregnancy, talk to your doctor about getting it. It will help protect your  against pertussis infection. Follow-up care is a key part of your treatment and safety. Be sure to make and go to all appointments, and call your doctor if you are having problems. It's also a good idea to know your test results and keep a list of the medicines you take. How can you care for yourself at home? Pay attention to your baby's movements  · You should feel your baby move several times every day. · Your baby now turns less, and kicks and jabs more. · Your baby sleeps 20 to 45 minutes at a time and is more active at certain times of day. · If your doctor wants you to count your baby's kicks:  ? Empty your bladder, and lie on your side or relax in a comfortable chair. ? Write down your start time. ? Pay attention only to your baby's movements. Count any movement except hiccups. ? After you have counted 10 movements, write down your stop time. ? Write down how many minutes it took for your baby to move 10 times.   ? If an hour goes by and you have not recorded 10 movements, have something to eat or drink and then count for another hour. If you do not record 10 movements in either hour, call your doctor. Ease heartburn  · Eat small, frequent meals. · Do not eat chocolate, peppermint, or very spicy foods. Avoid drinks with caffeine, such as coffee, tea, and sodas. · Avoid bending over or lying down after meals. · Talk a short walk after you eat. · If heartburn is a problem at night, do not eat for 2 hours before bedtime. · Take antacids like Mylanta, Maalox, Rolaids, or Tums. Do not take antacids that have sodium bicarbonate. Care for varicose veins  · Varicose veins are blood vessels that stretch out with the extra blood during pregnancy. Your legs may ache or throb. Most varicose veins will go away after the birth. · Avoid standing for long periods of time. Sit with your legs crossed at the ankles, not the knees. · Sit with your feet propped up. · Avoid tight clothing or stockings. Wear support hose. · Exercise regularly. Try walking for at least 30 minutes a day. Where can you learn more? Go to http://www.gray.com/  Enter X471 in the search box to learn more about \"Weeks 30 to 32 of Your Pregnancy: Care Instructions. \"  Current as of: October 8, 2020               Content Version: 12.8  © 6777-9775 [employer]. Care instructions adapted under license by [employer] (which disclaims liability or warranty for this information). If you have questions about a medical condition or this instruction, always ask your healthcare professional. [place] [address] any warranty or liability for your use of this information.

## 2021-07-15 ENCOUNTER — ROUTINE PRENATAL (OUTPATIENT)
Dept: OBGYN CLINIC | Age: 36
End: 2021-07-15
Payer: COMMERCIAL

## 2021-07-15 VITALS
SYSTOLIC BLOOD PRESSURE: 111 MMHG | HEART RATE: 119 BPM | WEIGHT: 181.4 LBS | BODY MASS INDEX: 33.18 KG/M2 | DIASTOLIC BLOOD PRESSURE: 76 MMHG

## 2021-07-15 DIAGNOSIS — O09.529 ANTEPARTUM MULTIGRAVIDA OF ADVANCED MATERNAL AGE: Primary | ICD-10-CM

## 2021-07-15 DIAGNOSIS — Z98.891 H/O: C-SECTION: ICD-10-CM

## 2021-07-15 DIAGNOSIS — Z3A.30 30 WEEKS GESTATION OF PREGNANCY: ICD-10-CM

## 2021-07-15 PROCEDURE — 0502F SUBSEQUENT PRENATAL CARE: CPT | Performed by: OBSTETRICS & GYNECOLOGY

## 2021-07-15 RX ORDER — BUPROPION HYDROCHLORIDE 300 MG/1
TABLET ORAL
COMMUNITY
Start: 2021-07-05

## 2021-07-15 NOTE — PROGRESS NOTES
[facility]  [email or web address]  [phone]    Patricia Carnes MD, FACOG     Follow-up OB visit    Chief Complaint   Patient presents with   [facility] Routine Prenatal Visit       Patient Active Problem List    Diagnosis Date Noted    Hx successful  (vaginal birth after ), currently pregnant 2021    H/O:  2021    H/O LEEP 2021    Depression 2021    Supervision of multigravida of advanced maternal age, antepartum 2021    HSV-2 infection 2018    Anxiety 2018    Hx of  section 2018          The patient reports the following concerns: Patient is reporting cramping and requests her cervix to checked today. Vitals:    07/15/21 0946   BP: 111/76   Pulse: (!) 119   Weight: 181 lb 6.4 oz (82.3 kg)     See PN flowsheet for exam    29 y.o.  30w2d   Encounter Diagnoses   Name Primary?  Antepartum multigravida of advanced maternal age Yes    27 weeks gestation of pregnancy     H/O:          [] SAB/bleeding precautions reviewed   [x] PTL/PPROM precautions reviewed   [] Labor precautions reviewed   [] Fetal kick counts discussed   [] Labs reviewed with patient   [] Deloris Nail precautions reviewed   [] Consent reviewed   [] Handouts given to pt   [] Glucola handout    [] GBS/labor/Magic Hour handout   []    [] We reviewed CDC recommendations for Tdap for patient and close contacts and RBA of receiving in pregnancy, advised obtaining in third trimester   [] Reviewed healthy nutrition in pregnancy and good exercise practices   [] We disc safer medications in pregnancy and referred patient to [facility] GINO resources   [] We reviewed CDC recommendations for flu vaccine and RBA of receiving in pregnancy   []    []    []       Follow-up and Dispositions    · Return in 2 weeks (on 2021) for routine prenatal care, Follow up OB visit. No orders of the defined types were placed in this encounter.       Korin Isidro Cyndi Sloan MD

## 2021-07-29 NOTE — PATIENT INSTRUCTIONS
Weeks 32 to 34 of Your Pregnancy: Care Instructions  Overview     During the last few weeks of your pregnancy, you may have more aches and pains. It's important to rest when you can. Your growing baby is putting more pressure on your bladder. So you may need to urinate more often. Hemorrhoids are also common. These are painful, itchy veins in the rectal area. You may want to talk with your doctor about banking your baby's umbilical cord blood. This is the blood left in the cord after birth. If you want to save this blood, you must arrange it ahead of time. You can't decide at the last minute. If you haven't already had the Tdap shot during this pregnancy, talk to your doctor about getting it. It will help protect your  against pertussis infection. Follow-up care is a key part of your treatment and safety. Be sure to make and go to all appointments, and call your doctor if you are having problems. It's also a good idea to know your test results and keep a list of the medicines you take. How can you care for yourself at home? Ease hemorrhoids  · Get more liquids, fruits, vegetables, and fiber in your diet. This will help keep your stools soft. · Avoid sitting for too long. Lie on your left side several times a day. · Clean yourself with soft, moist toilet paper. Or you can use witch hazel pads or personal hygiene pads. · If you are uncomfortable, try ice packs. Or you can sit in a warm sitz bath. Do these for 20 minutes at a time, as needed. · Use hydrocortisone cream for pain and itching. Two examples are Anusol and Preparation H Hydrocortisone. · Ask your doctor about taking an over-the-counter stool softener. Consider breastfeeding  · Experts recommend that women breastfeed for 1 year or longer. · Breast milk may help protect your child from some health problems.  babies are less likely than formula-fed babies to:  ? Get ear infections, colds, diarrhea, and pneumonia. ?  Be obese or get diabetes later in life. · Women who breastfeed have less bleeding after the birth. Their uteruses also shrink back faster. · Some women who breastfeed lose weight faster. Making milk burns calories. · Breastfeeding can lower your risk of breast cancer, ovarian cancer, and osteoporosis. Decide about circumcision for boys  · As you make this decision, it may help to think about your personal, Shinto, and family traditions. You get to decide if you will keep your son's penis natural or if he will be circumcised. · If you decide that you would like to have your baby circumcised, talk with your doctor. You can share your concerns about pain. And you can discuss your preferences for anesthesia. Where can you learn more? Go to http://www.[employer].com/  Enter X711 in the search box to learn more about \"Weeks 32 to 34 of Your Pregnancy: Care Instructions. \"  Current as of: October 8, 2020               Content Version: 12.8  © 1759-6071 [employer]. Care instructions adapted under license by [employer] (which disclaims liability or warranty for this information). If you have questions about a medical condition or this instruction, always ask your healthcare professional. [place] [address] any warranty or liability for your use of this information.

## 2021-07-30 ENCOUNTER — ROUTINE PRENATAL (OUTPATIENT)
Dept: OBGYN CLINIC | Age: 36
End: 2021-07-30
Payer: COMMERCIAL

## 2021-07-30 ENCOUNTER — PATIENT MESSAGE (OUTPATIENT)
Dept: OBGYN CLINIC | Age: 36
End: 2021-07-30

## 2021-07-30 VITALS
DIASTOLIC BLOOD PRESSURE: 77 MMHG | WEIGHT: 184.8 LBS | HEART RATE: 114 BPM | BODY MASS INDEX: 33.8 KG/M2 | SYSTOLIC BLOOD PRESSURE: 124 MMHG

## 2021-07-30 DIAGNOSIS — O09.529 SUPERVISION OF MULTIGRAVIDA OF ADVANCED MATERNAL AGE, ANTEPARTUM: ICD-10-CM

## 2021-07-30 DIAGNOSIS — Z3A.32 32 WEEKS GESTATION OF PREGNANCY: Primary | ICD-10-CM

## 2021-07-30 DIAGNOSIS — Z98.891 H/O: C-SECTION: ICD-10-CM

## 2021-07-30 DIAGNOSIS — O34.219 HX SUCCESSFUL VBAC (VAGINAL BIRTH AFTER CESAREAN), CURRENTLY PREGNANT: ICD-10-CM

## 2021-07-30 PROCEDURE — 0502F SUBSEQUENT PRENATAL CARE: CPT | Performed by: OBSTETRICS & GYNECOLOGY

## 2021-07-30 RX ORDER — ESCITALOPRAM OXALATE 20 MG/1
TABLET ORAL DAILY
COMMUNITY
Start: 2021-07-28

## 2021-07-30 NOTE — PROGRESS NOTES
[facility]  [email or web address]  [phone]    Karen Hernandez MD, FACOG     Follow-up OB visit    Chief Complaint   Patient presents with   Som Galla Routine Prenatal Visit       Patient Active Problem List    Diagnosis Date Noted    Hx successful  (vaginal birth after ), currently pregnant 2021    H/O:  2021    H/O LEEP 2021    Depression 2021    Supervision of multigravida of advanced maternal age, antepartum 2021    HSV-2 infection 2018    Anxiety 2018    Hx of  section 2018          The patient reports the following concerns: none  Mood good    Vitals:    21 1117   BP: 124/77   Pulse: (!) 114   Weight: 184 lb 12.8 oz (83.8 kg)     See PN flowsheet for exam    29 y.o.  32w3d   Encounter Diagnoses   Name Primary?     Supervision of multigravida of advanced maternal age, antepartum    Som Galla 26 weeks gestation of pregnancy Yes    Hx successful  (vaginal birth after ), currently pregnant     H/O:       Disc options for GERD sx in pregnancy, pt will add pepcid to tums, notify MD if NI   [] SAB/bleeding precautions reviewed   [] PTL/PPROM precautions reviewed   [] Labor precautions reviewed   [] Fetal kick counts discussed   [] Labs reviewed with patient   [] Vanessa Albarado precautions reviewed   [] Consent reviewed   [] Handouts given to pt   [] Glucola handout    [] GBS/labor/Magic Hour handout   []    [] We reviewed CDC recommendations for Tdap for patient and close contacts and RBA of receiving in pregnancy, advised obtaining in third trimester   [] Reviewed healthy nutrition in pregnancy and good exercise practices   [] We disc safer medications in pregnancy and referred patient to [facility] GINO resources   [] We reviewed CDC recommendations for flu vaccine and RBA of receiving in pregnancy   []    []    []       Follow-up and Dispositions    · Return in about 2 weeks (around 2021) for Follow up OB visit. No orders of the defined types were placed in this encounter.       Beatriz Vidal MD

## 2021-08-03 ENCOUNTER — TELEPHONE (OUTPATIENT)
Dept: OBGYN CLINIC | Age: 36
End: 2021-08-03

## 2021-08-03 NOTE — TELEPHONE ENCOUNTER
Call received @1034   33 week pt    Last seen in office     Pt calling with c/o stuffy nose, cough, and occasional SOB. She states she did a virtual visit with her MD yesterday and was prescribed her antibiotics. Pt states her 3year old son was taken out of school due to [place] outbreak. Pt states she gets tested for her job and tested negative for [place] on . This RN advised pt to go to urgent care and get tested again due to her SOB and symptoms.  RN also advised pt to send message to MD in [employer] with results of COVID test. Pt verbalized understanding    Please review

## 2021-08-13 ENCOUNTER — ROUTINE PRENATAL (OUTPATIENT)
Dept: OBGYN CLINIC | Age: 36
End: 2021-08-13

## 2021-08-13 VITALS
DIASTOLIC BLOOD PRESSURE: 80 MMHG | WEIGHT: 185.4 LBS | BODY MASS INDEX: 34.12 KG/M2 | HEIGHT: 62 IN | SYSTOLIC BLOOD PRESSURE: 126 MMHG | HEART RATE: 111 BPM

## 2021-08-13 DIAGNOSIS — O09.529 SUPERVISION OF MULTIGRAVIDA OF ADVANCED MATERNAL AGE, ANTEPARTUM: Primary | ICD-10-CM

## 2021-08-13 PROCEDURE — 0502F SUBSEQUENT PRENATAL CARE: CPT | Performed by: OBSTETRICS & GYNECOLOGY
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
## 2021-08-13 NOTE — PATIENT INSTRUCTIONS
Weeks 34 to 36 of Your Pregnancy: Care Instructions  Overview     By now, your baby and your belly have grown quite large. It's almost time to give birth! Your baby's lungs are almost ready to breathe air. The skull bones are firm enough to protect your baby's head, but soft enough to move down through the birth canal.  You may be feeling excited and happy at times--but also anxious or scared. You might wonder how you'll know if you're in labor or what to expect during labor. Try to be open and flexible in your expectations of the birth. Because each birth is different, there's no way to know exactly what childbirth will be like for you. Talk to your doctor or midwife about any concerns you have. If you haven't already had the Tdap shot during this pregnancy, talk to your doctor about getting it. It will help protect your  against pertussis infection. In the 36th week, most women have a test for group B streptococcus (GBS). GBS is a common bacteria that can live in the vagina and rectum. It can make your baby sick after birth. If you test positive, you will get antibiotics during labor. The medicine will help keep your baby from getting the bacteria. Follow-up care is a key part of your treatment and safety. Be sure to make and go to all appointments, and call your doctor if you are having problems. It's also a good idea to know your test results and keep a list of the medicines you take. How can you care for yourself at home? Learn about pain relief choices  · Pain is different for every woman. Talk with your doctor about your feelings about pain. · You can choose from several types of pain relief. These include medicine or breathing techniques, as well as comfort measures. You can use more than one option. · If you choose to have pain medicine during labor, talk to your doctor about your options. Some medicines lower anxiety and help with some of the pain.  Others make your lower body numb so that you won't feel pain. · Be sure to tell your doctor about your pain medicine choice before you start labor or very early in your labor. You may be able to change your mind as labor progresses. · Rarely, a woman is put to sleep by medicine given through a mask or an IV. Labor and delivery  · The first stage of labor has three parts: early, active, and transition. ? Most women have early labor at home. You can stay busy or rest, eat light snacks, drink clear fluids, and start counting contractions. ? When talking during a contraction gets hard, you may be moving to active labor. During active labor, you should head for the hospital if you are not there already. ? You are in active labor when contractions come every 3 to 4 minutes and last about 60 seconds. Your cervix is opening more rapidly. ? If your water breaks, contractions will come faster and stronger. ? During transition, your cervix is stretching, and contractions are coming more rapidly. ? You may want to push, but your cervix might not be ready. Your doctor will tell you when to push. · The second stage starts when your cervix is completely opened and you are ready to push. ? Contractions are very strong to push the baby down the birth canal.  ? You will feel the urge to push. You may feel like you need to have a bowel movement. ? You may be coached to push with contractions. These contractions will be very strong, but you will not have them as often. You can get a little rest between contractions. ? You may be emotional and irritable. You may not be aware of what is going on around you.  ? One last push, and your baby is born. · The third stage is when a few more contractions push out the placenta. This may take 30 minutes or less. · The fourth stage is the welcome recovery. You may feel overwhelmed with emotions and exhausted but alert. This is a good time to start breastfeeding. Where can you learn more?   Go to http://laith-franklin.info/  Enter R278 in the search box to learn more about \"Weeks 34 to 36 of Your Pregnancy: Care Instructions. \"  Current as of: October 8, 2020               Content Version: 12.8  © 5567-8503 Healthwise, Incorporated. Care instructions adapted under license by [employer] (which disclaims liability or warranty for this information). If you have questions about a medical condition or this instruction, always ask your healthcare professional. [address] any warranty or liability for your use of this information.

## 2021-08-23 ENCOUNTER — TELEPHONE (OUTPATIENT)
Dept: OBGYN CLINIC | Age: 36
End: 2021-08-23

## 2021-08-23 NOTE — TELEPHONE ENCOUNTER
Call received @1019   35w6d   TP pt    Pt calling with c/o bronchitis x 5-6 weeks now. Pt completed prednisone and 2 z-packs as prescribed, but no relief currently. Pt states she went to [facility] yesterday and they recommended a CXR, but didn't feel comfortable doing one until she spoke with OB. Pt has had fever up to 101.2 recently, which is relieved with advil. Pt is negative for COVID, and has been tested in better [facility]. ?OV  ? Recommendations

## 2021-08-23 NOTE — TELEPHONE ENCOUNTER
Rec getting a PCP. Can share my list    Dr. Sarah Martinez Contacts:  Here are some useful numbers, websites, information    [facility] website  Medication lists and resources  [email or web address]    [facility]  Primary Care Providers  [phone]    [facility]  Primary Care Providers  [phone]      [address] [facility]  [phone]     The [address]:   [address]  [phone]     [facility]  [phone]    Weight loss/Nutrition: Dr. Leonel Bosch  [phone]     Infertility/ELAN  [phone]  [email or web address]     GI: [facility]: Dr. HONG [facility]  [phone]    GI: [facility]: Dr. Vince Bentley  [phone]     [facility]  [phone]     [facility] Dermatology: Dr. Kwabena Maddox  [address] [facility] [address] Dermatology: [address] [phone]    [facility] Physical Therapy- [address]  [phone]     General Surgery: Dr. Leland Almendarez  [phone]     Ibuprofen 600 mg  200mg X 3 =600mg  Every 6 hours for up to 5 days  Take with food     Breast Pain Remedies  400µ Vitamin E  Decrease Caffeine  Evening [address] Specialist:   [facility]  [address] [facility]  [phone]    Gyn-Oncology:   Dr Little Knowles,  Dr. Mickie Garsia  [phone]    OB tours and hospital registration  [phone]  [email or web address]. HealthUnlocked    MFM, high risk Boris [facility], [address] [phone]

## 2021-08-23 NOTE — TELEPHONE ENCOUNTER
Yes, she can get a CXR, just make sure they shield her abdomen. The risk is minimal.    Did they retest for COVID?     Trp

## 2021-08-27 ENCOUNTER — HOSPITAL ENCOUNTER (OUTPATIENT)
Dept: PERINATAL CARE | Age: 36
Discharge: HOME | End: 2021-08-27
Attending: OBSTETRICS & GYNECOLOGY
Payer: COMMERCIAL

## 2021-08-27 ENCOUNTER — ROUTINE PRENATAL (OUTPATIENT)
Dept: OBGYN CLINIC | Age: 36
End: 2021-08-27
Payer: COMMERCIAL

## 2021-08-27 VITALS
WEIGHT: 190.6 LBS | DIASTOLIC BLOOD PRESSURE: 82 MMHG | SYSTOLIC BLOOD PRESSURE: 134 MMHG | BODY MASS INDEX: 34.86 KG/M2 | HEART RATE: 118 BPM

## 2021-08-27 DIAGNOSIS — Z98.891 H/O: C-SECTION: ICD-10-CM

## 2021-08-27 DIAGNOSIS — J40 BRONCHITIS: ICD-10-CM

## 2021-08-27 DIAGNOSIS — O34.219 HX SUCCESSFUL VBAC (VAGINAL BIRTH AFTER CESAREAN), CURRENTLY PREGNANT: ICD-10-CM

## 2021-08-27 DIAGNOSIS — O09.529 ELDERLY MULTIGRAVIDA WITH ANTEPARTUM CONDITION OR COMPLICATION: Primary | ICD-10-CM

## 2021-08-27 LAB — GRBS, EXTERNAL: POSITIVE

## 2021-08-27 PROCEDURE — 0502F SUBSEQUENT PRENATAL CARE: CPT | Performed by: OBSTETRICS & GYNECOLOGY

## 2021-08-27 PROCEDURE — 76816 OB US FOLLOW-UP PER FETUS: CPT | Performed by: OBSTETRICS & GYNECOLOGY

## 2021-08-27 PROCEDURE — 76819 FETAL BIOPHYS PROFIL W/O NST: CPT | Performed by: OBSTETRICS & GYNECOLOGY

## 2021-08-27 NOTE — PROGRESS NOTES
_ [facility]  [email or web address]  [phone]    Blayne Mike MD, FACOG     Follow-up OB visit    Chief Complaint   Patient presents with   [facility] Routine Prenatal Visit       Patient Active Problem List    Diagnosis Date Noted    Hx successful  (vaginal birth after ), currently pregnant 2021    H/O:  2021    H/O LEEP 2021    Depression 2021    Supervision of multigravida of advanced maternal age, antepartum 2021    HSV-2 infection 2018    Anxiety 2018    Hx of  section 2018          The patient reports the following concerns: Pt. Reports that she was taking prednisone. She reports that the  said that she needed an extended steroid for her bronchitis. There were no vitals filed for this visit. See PN flowsheet for exam    29 y.o.  36w3d   Encounter Diagnosis   Name Primary?  Elderly multigravida with antepartum condition or complication Yes     rec PCP fu for bronchitis management but ok to steroids if needed  Weekly US for poly: Per [facility]fm [facility] do at [facility]     [] SAB/bleeding precautions reviewed   [] PTL/PPROM precautions reviewed   [] Labor precautions reviewed   [] Fetal kick counts discussed   [] Labs reviewed with patient   [] Santana Burner precautions reviewed   [] Consent reviewed   [] Handouts given to pt   [] Glucola handout    [] GBS/labor/Magic Hour handout   []    [] We reviewed CDC recommendations for Tdap for patient and close contacts and RBA of receiving in pregnancy, advised obtaining in third trimester   [] Reviewed healthy nutrition in pregnancy and good exercise practices   [] We disc safer medications in pregnancy and referred patient to [facility] GINO resources   [] We reviewed CDC recommendations for flu vaccine and RBA of receiving in pregnancy   []    []    []       Follow-up and Dispositions    · Return for routine prenatal care.          No orders of the defined types were placed in this encounter.       Nitin Samuels MD

## 2021-08-27 NOTE — PATIENT INSTRUCTIONS
Weeks 34 to 36 of Your Pregnancy: Care Instructions  Overview     By now, your baby and your belly have grown quite large. It's almost time to give birth! Your baby's lungs are almost ready to breathe air. The skull bones are firm enough to protect your baby's head, but soft enough to move down through the birth canal.  You may be feeling excited and happy at timesbut also anxious or scared. You might wonder how you'll know if you're in labor or what to expect during labor. Try to be open and flexible in your expectations of the birth. Because each birth is different, there's no way to know exactly what childbirth will be like for you. Talk to your doctor or midwife about any concerns you have. If you haven't already had the Tdap shot during this pregnancy, talk to your doctor about getting it. It will help protect your  against pertussis infection. In the 36th week, most women have a test for group B streptococcus (GBS). GBS is a common bacteria that can live in the vagina and rectum. It can make your baby sick after birth. If you test positive, you will get antibiotics during labor. The medicine will help keep your baby from getting the bacteria. Follow-up care is a key part of your treatment and safety. Be sure to make and go to all appointments, and call your doctor if you are having problems. It's also a good idea to know your test results and keep a list of the medicines you take. How can you care for yourself at home? Learn about pain relief choices  · Pain is different for every woman. Talk with your doctor about your feelings about pain. · You can choose from several types of pain relief. These include medicine or breathing techniques, as well as comfort measures. You can use more than one option. · If you choose to have pain medicine during labor, talk to your doctor about your options. Some medicines lower anxiety and help with some of the pain.  Others make your lower body numb so that you won't feel pain. · Be sure to tell your doctor about your pain medicine choice before you start labor or very early in your labor. You may be able to change your mind as labor progresses. · Rarely, a woman is put to sleep by medicine given through a mask or an IV. Labor and delivery  · The first stage of labor has three parts: early, active, and transition. ? Most women have early labor at home. You can stay busy or rest, eat light snacks, drink clear fluids, and start counting contractions. ? When talking during a contraction gets hard, you may be moving to active labor. During active labor, you should head for the hospital if you are not there already. ? You are in active labor when contractions come every 3 to 4 minutes and last about 60 seconds. Your cervix is opening more rapidly. ? If your water breaks, contractions will come faster and stronger. ? During transition, your cervix is stretching, and contractions are coming more rapidly. ? You may want to push, but your cervix might not be ready. Your doctor will tell you when to push. · The second stage starts when your cervix is completely opened and you are ready to push. ? Contractions are very strong to push the baby down the birth canal.  ? You will feel the urge to push. You may feel like you need to have a bowel movement. ? You may be coached to push with contractions. These contractions will be very strong, but you will not have them as often. You can get a little rest between contractions. ? You may be emotional and irritable. You may not be aware of what is going on around you.  ? One last push, and your baby is born. · The third stage is when a few more contractions push out the placenta. This may take 30 minutes or less. · The fourth stage is the welcome recovery. You may feel overwhelmed with emotions and exhausted but alert. This is a good time to start breastfeeding. Where can you learn more?   Go to http://www.gray.com/  Enter D594 in the search box to learn more about \"Weeks 34 to 36 of Your Pregnancy: Care Instructions. \"  Current as of: October 8, 2020               Content Version: 12.8  © 2006-2021 Healthwise, [employer]. Care instructions adapted under license by [employer] (which disclaims liability or warranty for this information). If you have questions about a medical condition or this instruction, always ask your healthcare professional. [address] any warranty or liability for your use of this information.

## 2021-08-29 ENCOUNTER — HOSPITAL ENCOUNTER (INPATIENT)
Age: 36
LOS: 2 days | Discharge: HOME | End: 2021-08-31
Attending: OBSTETRICS & GYNECOLOGY | Admitting: OBSTETRICS & GYNECOLOGY
Payer: COMMERCIAL

## 2021-08-29 PROBLEM — Z34.90 PREGNANT: Status: ACTIVE | Noted: 2021-08-29

## 2021-08-29 LAB
A1 MICROGLOB PLACENTAL VAG QL: POSITIVE
ALBUMIN SERPL-MCNC: 2.6 G/DL (ref 3.5–5)
ALBUMIN/GLOB SERPL: 0.8 {RATIO} (ref 1.1–2.2)
ALP SERPL-CCNC: 143 U/L (ref 45–117)
ALT SERPL-CCNC: 60 U/L (ref 12–78)
AMPHET UR QL SCN: NEGATIVE
ANION GAP SERPL CALC-SCNC: 8 MMOL/L (ref 5–15)
AST SERPL-CCNC: 67 U/L (ref 15–37)
BACTERIA SPEC CULT: ABNORMAL
BARBITURATES UR QL SCN: NEGATIVE
BASOPHILS # BLD: 0 K/UL (ref 0–0.1)
BASOPHILS NFR BLD: 0 % (ref 0–1)
BENZODIAZ UR QL: NEGATIVE
BILIRUB SERPL-MCNC: 0.3 MG/DL (ref 0.2–1)
BUN SERPL-MCNC: 6 MG/DL (ref 6–20)
BUN/CREAT SERPL: 11 (ref 12–20)
CALCIUM SERPL-MCNC: 7.9 MG/DL (ref 8.5–10.1)
CANNABINOIDS UR QL SCN: NEGATIVE
CHLORIDE SERPL-SCNC: 108 MMOL/L (ref 97–108)
CO2 SERPL-SCNC: 20 MMOL/L (ref 21–32)
COCAINE UR QL SCN: NEGATIVE
COMMENT, HOLDF: NORMAL
CONTROL LINE PRESENT?: ABNORMAL
COVID-19 RAPID TEST, COVR: NOT DETECTED
CREAT SERPL-MCNC: 0.56 MG/DL (ref 0.55–1.02)
DIFFERENTIAL METHOD BLD: ABNORMAL
DRUG SCRN COMMENT,DRGCM: NORMAL
EOSINOPHIL # BLD: 0 K/UL (ref 0–0.4)
EOSINOPHIL NFR BLD: 0 % (ref 0–7)
ERYTHROCYTE [DISTWIDTH] IN BLOOD BY AUTOMATED COUNT: 15.1 % (ref 11.5–14.5)
EXPIRATION DATE: ABNORMAL
GLOBULIN SER CALC-MCNC: 3.4 G/DL (ref 2–4)
GLUCOSE SERPL-MCNC: 76 MG/DL (ref 65–100)
HCT VFR BLD AUTO: 32.1 % (ref 35–47)
HGB BLD-MCNC: 9.7 G/DL (ref 11.5–16)
IMM GRANULOCYTES # BLD AUTO: 0 K/UL (ref 0–0.04)
IMM GRANULOCYTES NFR BLD AUTO: 0 % (ref 0–0.5)
INTERNAL NEGATIVE CONTROL: ABNORMAL
KIT LOT NO.: ABNORMAL
LYMPHOCYTES # BLD: 1 K/UL (ref 0.8–3.5)
LYMPHOCYTES NFR BLD: 10 % (ref 12–49)
MCH RBC QN AUTO: 24 PG (ref 26–34)
MCHC RBC AUTO-ENTMCNC: 30.2 G/DL (ref 30–36.5)
MCV RBC AUTO: 79.5 FL (ref 80–99)
METHADONE UR QL: NEGATIVE
MONOCYTES # BLD: 0.5 K/UL (ref 0–1)
MONOCYTES NFR BLD: 4 % (ref 5–13)
NEUTS SEG # BLD: 8.8 K/UL (ref 1.8–8)
NEUTS SEG NFR BLD: 86 % (ref 32–75)
NRBC # BLD: 0 K/UL (ref 0–0.01)
NRBC BLD-RTO: 0 PER 100 WBC
OPIATES UR QL: NEGATIVE
PCP UR QL: NEGATIVE
PLATELET # BLD AUTO: 207 K/UL (ref 150–400)
PMV BLD AUTO: 11.5 FL (ref 8.9–12.9)
POTASSIUM SERPL-SCNC: 4.3 MMOL/L (ref 3.5–5.1)
PROT SERPL-MCNC: 6 G/DL (ref 6.4–8.2)
RBC # BLD AUTO: 4.04 M/UL (ref 3.8–5.2)
SAMPLES BEING HELD,HOLD: NORMAL
SERVICE CMNT-IMP: ABNORMAL
SODIUM SERPL-SCNC: 136 MMOL/L (ref 136–145)
SOURCE, COVRS: NORMAL
WBC # BLD AUTO: 10.3 K/UL (ref 3.6–11)

## 2021-08-29 PROCEDURE — 86694 HERPES SIMPLEX NES ANTBDY: CPT

## 2021-08-29 PROCEDURE — 75410000002 HC LABOR FEE PER 1 HR: Performed by: OBSTETRICS & GYNECOLOGY

## 2021-08-29 PROCEDURE — 75410000000 HC DELIVERY VAGINAL/SINGLE: Performed by: OBSTETRICS & GYNECOLOGY

## 2021-08-29 PROCEDURE — 84112 EVAL AMNIOTIC FLUID PROTEIN: CPT | Performed by: OBSTETRICS & GYNECOLOGY

## 2021-08-29 PROCEDURE — 85025 COMPLETE CBC W/AUTO DIFF WBC: CPT

## 2021-08-29 PROCEDURE — 99282 EMERGENCY DEPT VISIT SF MDM: CPT

## 2021-08-29 PROCEDURE — 65270000029 HC RM PRIVATE

## 2021-08-29 PROCEDURE — 75410000003 HC RECOV DEL/VAG/CSECN EA 0.5 HR: Performed by: OBSTETRICS & GYNECOLOGY

## 2021-08-29 PROCEDURE — 76815 OB US LIMITED FETUS(S): CPT | Performed by: OBSTETRICS & GYNECOLOGY

## 2021-08-29 PROCEDURE — 74011250636 HC RX REV CODE- 250/636: Performed by: OBSTETRICS & GYNECOLOGY

## 2021-08-29 PROCEDURE — 2709999900 HC NON-CHARGEABLE SUPPLY

## 2021-08-29 PROCEDURE — 86696 HERPES SIMPLEX TYPE 2 TEST: CPT

## 2021-08-29 PROCEDURE — 10D17Z9 MANUAL EXTRACTION OF PRODUCTS OF CONCEPTION, RETAINED, VIA NATURAL OR ARTIFICIAL OPENING: ICD-10-PCS | Performed by: OBSTETRICS & GYNECOLOGY

## 2021-08-29 PROCEDURE — 87635 SARS-COV-2 COVID-19 AMP PRB: CPT

## 2021-08-29 PROCEDURE — 36415 COLL VENOUS BLD VENIPUNCTURE: CPT

## 2021-08-29 PROCEDURE — 86695 HERPES SIMPLEX TYPE 1 TEST: CPT

## 2021-08-29 PROCEDURE — 80053 COMPREHEN METABOLIC PANEL: CPT

## 2021-08-29 PROCEDURE — 77030014136 HC TY PARCERV BD -A

## 2021-08-29 PROCEDURE — 74011000258 HC RX REV CODE- 258: Performed by: OBSTETRICS & GYNECOLOGY

## 2021-08-29 PROCEDURE — 74011250637 HC RX REV CODE- 250/637: Performed by: OBSTETRICS & GYNECOLOGY

## 2021-08-29 PROCEDURE — 80307 DRUG TEST PRSMV CHEM ANLYZR: CPT

## 2021-08-29 PROCEDURE — 59025 FETAL NON-STRESS TEST: CPT

## 2021-08-29 PROCEDURE — 65410000002 HC RM PRIVATE OB

## 2021-08-29 RX ORDER — ONDANSETRON 2 MG/ML
4 INJECTION INTRAMUSCULAR; INTRAVENOUS
Status: DISCONTINUED | OUTPATIENT
Start: 2021-08-29 | End: 2021-08-31 | Stop reason: HOSPADM

## 2021-08-29 RX ORDER — IBUPROFEN 800 MG/1
800 TABLET ORAL EVERY 8 HOURS
Status: DISCONTINUED | OUTPATIENT
Start: 2021-08-29 | End: 2021-08-31 | Stop reason: HOSPADM

## 2021-08-29 RX ORDER — OXYTOCIN/RINGER'S LACTATE 30/500 ML
10 PLASTIC BAG, INJECTION (ML) INTRAVENOUS AS NEEDED
Status: DISCONTINUED | OUTPATIENT
Start: 2021-08-29 | End: 2021-08-31 | Stop reason: HOSPADM

## 2021-08-29 RX ORDER — SODIUM CHLORIDE 0.9 % (FLUSH) 0.9 %
5-40 SYRINGE (ML) INJECTION EVERY 8 HOURS
Status: DISCONTINUED | OUTPATIENT
Start: 2021-08-29 | End: 2021-08-31 | Stop reason: HOSPADM

## 2021-08-29 RX ORDER — OXYTOCIN/RINGER'S LACTATE 30/500 ML
87.3 PLASTIC BAG, INJECTION (ML) INTRAVENOUS AS NEEDED
Status: DISCONTINUED | OUTPATIENT
Start: 2021-08-29 | End: 2021-08-31 | Stop reason: HOSPADM

## 2021-08-29 RX ORDER — SIMETHICONE 80 MG
80 TABLET,CHEWABLE ORAL
Status: DISCONTINUED | OUTPATIENT
Start: 2021-08-29 | End: 2021-08-31 | Stop reason: HOSPADM

## 2021-08-29 RX ORDER — NALOXONE HYDROCHLORIDE 0.4 MG/ML
0.4 INJECTION, SOLUTION INTRAMUSCULAR; INTRAVENOUS; SUBCUTANEOUS AS NEEDED
Status: DISCONTINUED | OUTPATIENT
Start: 2021-08-29 | End: 2021-08-31 | Stop reason: HOSPADM

## 2021-08-29 RX ORDER — OXYTOCIN/RINGER'S LACTATE 30/500 ML
0-20 PLASTIC BAG, INJECTION (ML) INTRAVENOUS
Status: DISCONTINUED | OUTPATIENT
Start: 2021-08-29 | End: 2021-08-31 | Stop reason: HOSPADM

## 2021-08-29 RX ORDER — MAG HYDROX/ALUMINUM HYD/SIMETH 200-200-20
30 SUSPENSION, ORAL (FINAL DOSE FORM) ORAL
Status: DISCONTINUED | OUTPATIENT
Start: 2021-08-29 | End: 2021-08-31 | Stop reason: HOSPADM

## 2021-08-29 RX ORDER — ACETAMINOPHEN 325 MG/1
650 TABLET ORAL
Status: DISCONTINUED | OUTPATIENT
Start: 2021-08-29 | End: 2021-08-31 | Stop reason: HOSPADM

## 2021-08-29 RX ORDER — SODIUM CHLORIDE, SODIUM LACTATE, POTASSIUM CHLORIDE, CALCIUM CHLORIDE 600; 310; 30; 20 MG/100ML; MG/100ML; MG/100ML; MG/100ML
125 INJECTION, SOLUTION INTRAVENOUS CONTINUOUS
Status: DISCONTINUED | OUTPATIENT
Start: 2021-08-29 | End: 2021-08-29

## 2021-08-29 RX ORDER — ADHESIVE BANDAGE
30 BANDAGE TOPICAL DAILY PRN
Status: DISCONTINUED | OUTPATIENT
Start: 2021-08-29 | End: 2021-08-31 | Stop reason: HOSPADM

## 2021-08-29 RX ORDER — SODIUM CHLORIDE 0.9 % (FLUSH) 0.9 %
5-40 SYRINGE (ML) INJECTION AS NEEDED
Status: DISCONTINUED | OUTPATIENT
Start: 2021-08-29 | End: 2021-08-31 | Stop reason: HOSPADM

## 2021-08-29 RX ORDER — IBUPROFEN 800 MG/1
800 TABLET ORAL EVERY 8 HOURS
Status: DISCONTINUED | OUTPATIENT
Start: 2021-08-29 | End: 2021-08-29 | Stop reason: SDUPTHER

## 2021-08-29 RX ORDER — HYDROCODONE BITARTRATE AND ACETAMINOPHEN 5; 325 MG/1; MG/1
1 TABLET ORAL
Status: DISCONTINUED | OUTPATIENT
Start: 2021-08-29 | End: 2021-08-31 | Stop reason: HOSPADM

## 2021-08-29 RX ORDER — ONDANSETRON 2 MG/ML
4 INJECTION INTRAMUSCULAR; INTRAVENOUS ONCE
Status: ACTIVE | OUTPATIENT
Start: 2021-08-29 | End: 2021-08-30

## 2021-08-29 RX ORDER — IBUPROFEN 800 MG/1
800 TABLET ORAL EVERY 8 HOURS
Status: DISCONTINUED | OUTPATIENT
Start: 2021-08-29 | End: 2021-08-29

## 2021-08-29 RX ORDER — HYDROCORTISONE ACETATE PRAMOXINE HCL 2.5; 1 G/100G; G/100G
CREAM TOPICAL AS NEEDED
Status: DISCONTINUED | OUTPATIENT
Start: 2021-08-29 | End: 2021-08-29 | Stop reason: RX

## 2021-08-29 RX ORDER — SODIUM CHLORIDE, SODIUM LACTATE, POTASSIUM CHLORIDE, CALCIUM CHLORIDE 600; 310; 30; 20 MG/100ML; MG/100ML; MG/100ML; MG/100ML
125 INJECTION, SOLUTION INTRAVENOUS CONTINUOUS
Status: DISCONTINUED | OUTPATIENT
Start: 2021-08-29 | End: 2021-08-31 | Stop reason: HOSPADM

## 2021-08-29 RX ADMIN — SODIUM CHLORIDE, POTASSIUM CHLORIDE, SODIUM LACTATE AND CALCIUM CHLORIDE 125 ML/HR: 600; 310; 30; 20 INJECTION, SOLUTION INTRAVENOUS at 10:17

## 2021-08-29 RX ADMIN — SODIUM CHLORIDE 5 MILLION UNITS: 900 INJECTION INTRAVENOUS at 10:18

## 2021-08-29 RX ADMIN — SODIUM CHLORIDE 2.5 MILLION UNITS: 9 INJECTION, SOLUTION INTRAVENOUS at 14:36

## 2021-08-29 RX ADMIN — IBUPROFEN 800 MG: 800 TABLET, FILM COATED ORAL at 19:14

## 2021-08-29 RX ADMIN — OXYTOCIN 2 MILLI-UNITS/MIN: 10 INJECTION, SOLUTION INTRAMUSCULAR; INTRAVENOUS at 15:36

## 2021-08-29 NOTE — PROGRESS NOTES
The patient's contractions have decreased significantly in frequency and she recently noticed significant movement of her baby. Visit Vitals  BP (!) 141/86   Pulse (!) 110   Temp 98.2 °F (36.8 °C)   Resp 16   Ht 5' 2\" (1.575 m)   Wt 83.9 kg (185 lb)   SpO2 100%   BMI 33.84 kg/m²     FHR: 135 moderate variability, accelerations present, no decelerations, cat 1  [place]: contractions q 8 minutes  SVE: 5-6/90/-3 vtx, large fore bag, amniotomy of fore bag with pudeal needle with moderate amount of clear fluid noted, the infant's head was noted to be well applied to the cervix during contractions    Ass/Plan:  at 36 5/7 wks with PPROM, TOLAC,  transitioning into active labor, cat 1 fetal tracing  Continue to monitor contraction pattern. If inadequate contractions after 1-2 hour of monitoring will initiate pitocin augmentation.

## 2021-08-29 NOTE — L&D DELIVERY NOTE
Delivery Summary    Patient: Parrish Wiseman MRN: [id number]  SSN: [id number]    YOB: 1985  Age: 29 y.o. Sex: female       of a term viable male infant in EVANGELISTA presentation over an intact perineum with apgars 7,7. The head and anterior shoulder delivered easily. There was a tight nuchal cord that was doubly clamped and transected. The infant's posterior shoulder and torso then delivered easily. The infant was placed on the mother's abdomen upon delivery. The placenta was manually expressed and appeared intact.  mL. The infant was later transferred to the NICU secondary to posturing movements and his apgars were were changed to 7,4,9. Information for the patient's :  Janeen Simms, Male Gilma Jean Pierre [[id number]]       Labor Events:    Labor:      Steroids: None   Cervical Ripening Date/Time:       Cervical Ripening Type: None   Antibiotics During Labor: Yes   Rupture Identifier:      Rupture Date/Time: 2021 3:00 AM   Rupture Type: AROM;SROM   Amniotic Fluid Volume:      Amniotic Fluid Description: Clear    Amniotic Fluid Odor: None    Induction: None       Induction Date/Time:        Indications for Induction:      Augmentation: AROM; Oxytocin   Augmentation Date/Time:      Indications for Augmentation: Ineffective Contraction Pattern   Labor complications:          Additional complications:        Delivery Events:  Indications For Episiotomy:     Episiotomy: None   Perineal Laceration(s): None   Repaired:     Periurethral Laceration Location:      Repaired:     Labial Laceration Location:     Repaired:     Sulcal Laceration Location:     Repaired:     Vaginal Laceration Location:     Repaired:     Cervical Laceration Location:     Repaired:     Repair Suture: None   Number of Repair Packets:     Estimated Blood Loss (ml):  ml   Quantitative Blood Loss (ml)                Delivery Date: 2021    Delivery Time: 5:38 PM  Delivery Type: Vaginal, Spontaneous  Sex: Male    Gestational Age: 44w9d   Delivery Clinician:  Francoise Chang  Living Status:     Delivery Location: L&D            APGARS  One minute Five minutes Ten minutes   Skin color:            Heart rate:            Grimace:            Muscle tone:            Breathing: Totals:                Presentation: Vertex    Position:   Occiput    Resuscitation Method:  Suctioning-bulb; Tactile Stimulation     Meconium Stained: Other (Comment) thin and terminal    Cord Information: 3 Vessels  Complications: Nuchal Cord Without Compressions  Cord around: head  Delayed cord clamping? No  Cord clamped date/time:2021  5:38 PM  Disposition of Cord Blood: Lab    Blood Gases Sent?: No    Placenta:  Date/Time: 2021  5:41 PM  Removal: Expressed      Appearance: Normal      Measurements:  Birth Weight:        Birth Length:        Head Circumference:        Chest Circumference:       Abdominal Girth: Other Providers:   FELIPA Rae;MELISSA BURKETT;ASAF KENDRICK;Elvira KEE, Obstetrician;Primary Nurse;Primary [place] Nurse;Charge Nurse;Staff Nurse;Tech           Group B Strep:   Lab Results   Component Value Date/Time    GrLEOPOLDOtrep, External POSITIVE 2019 12:00 AM     Information for the patient's :  [facility], Male Vannie Backers [[id number]]   No results found for: ABORH, PCTABR, PCTDIG, BILI, ABORHEXT, ABORH     No results for input(s): PCO2CB, PO2CB, HCO3I, SO2I, IBD, PTEMPI, SPECTI, PHICB, ISITE, IDEV, IALLEN in the last 72 hours.

## 2021-08-29 NOTE — PROGRESS NOTES
Patient ambulatory onto unit with cc of intermittent abdominal contractions that began yesterday around 1430. Patient reports taking acetaminophen both yesterday and today without relief. Patient reports + FM. Patient unsure of LOF and reports \"trickling\". Patient also reports scant \"pink\" vaginal discharge, but denies seeing bright rubra blood. Patient reports hx of anemia during pregnancy. Hx of  x1 and a successful . Patient accompanied by FOB.    0900: Amnisure positive. Patient to be admitted to unit for labor. Patient and spouse oriented to room, call bell, and emergency call bell use. Bed locked and lowered. 6661: This RN Liliana Villafuerte MD of patient's arrival to unit and SBAR. MD verbalizing understanding, TORB to speak with hospitalist MD regarding admission. 5679: This RN informing Marquis GONG of patient's arrival to unit and SBAR. MD verbalizing understanding and to assess patient at bedside. VORB from [facility] MD to allow intermittent FHR monitoring and ambulation on unit. 1048: This RN disconnecting patient from [facility] for ambulation on unit. Patient provided with disposable underwear, blue chux, and prakash pad. Patient declines gripper socks. Patient to use restroom prior to ambulation on unit. Patient denies desire to receive continuous LR infusion; this RN verbalizing understanding and educating patient on possibility for LR bolus' as FHR interventions PRN. Patient verbalizing understanding; this RN to inform MD.    1498: Brianna Griffin from [facility] MD for continuous EFM. 1110: Patient placed on EFM and ambulatory at bedside. Patient prefers to sway at bedside. Patient provided with stabilized birthing ball as well. FOB remains at bedside. Patient denies further needs. This RN encouraging patient to continue with PO hydration. 1315: AROM of forebag by [facility] MD using pudendal needle. Patient tolerated well.  MD remains at patient perineum to ensure lack of cord prolaspe, fetal head ballotable prior to AROM occurrence. 1321: MD remains at patient perineum. One contraction since AROM of forebag. 1326Jormindy Doss MD leaving the bedside. FHR WDL. SVE unchanged. Bed pad and chux changed. Patient denies current questions. Bed remains locked and lowered. Patient consenting to position changes to assist with engaging baby into pelvis. 1336: Patient positioned onto left side lying hip release. 1346: Patient repositioned onto right side lying hip release. 1355: Bed pad, chux changed, disposable underwear applied. Patient assisted into knees and forearms. Patient tolerating well. 1544: Patient repositioned onto lateral left with supplemental pillows behind back and under abdomen, HOB elevation, and peanut ball between BLE. Patient and FOB educated on purpose of peanut ball. Patient denies additional needs. TOCO adjusted multiple times. 1619: Patient repositioned into semi fowlers with LLE in butterfly position and peanut ball under RLE.    1704: EFM tracing maternal HR; patient sitting at 90 degree angle in bed. This RN lowering bottom portion of bed to create chair-like position in bed.    1729: SVE performed by this RN due to patient's urge to push: 10/100/+2. This RN asking Staff RN to have Marquis GONG come to bedside for delivery. 18:  of infant male. Infant immediately placed on maternal abdomen, dried and stimulated by this RN and Meet Christensen RN. Infant moved to warmer for further assessment. Infant transferred to warmer    1847: Patient sitting up in position of comfort to eat meal tray. Patient denies other needs at this time. 1900: Bedside and Verbal shift change report given to Genia Matias RN (oncoming nurse) by Laverne Jacob RN (offgoing nurse). Report included the following information SBAR, Intake/Output, MAR and Recent Results.

## 2021-08-29 NOTE — PROGRESS NOTES
7:10 PM  SBAR report received from Alyssa Bazzi RN. Assumed care of the patient. 8:15 PM  Assisted the patient to the restroom. She voided without difficulty. Yolie care performed and assisted her back to bed. Patient is without complaints at this time. 9:30 PM  Assisted the patient with the breast pump. 11:09 PM  Patient out of the NICU at this time. Patient moved to room 214.  7:18 AM  SBAR report given to August Sullivan RN. Care of the patient turned over.
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
## 2021-08-30 VITALS
OXYGEN SATURATION: 98 % | HEIGHT: 62 IN | HEART RATE: 86 BPM | TEMPERATURE: 98 F | SYSTOLIC BLOOD PRESSURE: 133 MMHG | BODY MASS INDEX: 34.04 KG/M2 | WEIGHT: 185 LBS | RESPIRATION RATE: 16 BRPM | DIASTOLIC BLOOD PRESSURE: 86 MMHG

## 2021-08-30 PROCEDURE — 65410000002 HC RM PRIVATE OB

## 2021-08-30 PROCEDURE — 74011250637 HC RX REV CODE- 250/637: Performed by: OBSTETRICS & GYNECOLOGY

## 2021-08-30 RX ORDER — DOCUSATE SODIUM 100 MG/1
100 CAPSULE, LIQUID FILLED ORAL DAILY
Status: DISCONTINUED | OUTPATIENT
Start: 2021-08-30 | End: 2021-08-31 | Stop reason: HOSPADM

## 2021-08-30 RX ORDER — IBUPROFEN 600 MG/1
600 TABLET ORAL
Qty: 30 TABLET | Refills: 0 | Status: SHIPPED | OUTPATIENT
Start: 2021-08-30 | End: 2022-08-19

## 2021-08-30 RX ORDER — DOCUSATE SODIUM 100 MG/1
100 CAPSULE, LIQUID FILLED ORAL DAILY
Status: DISCONTINUED | OUTPATIENT
Start: 2021-08-31 | End: 2021-08-30

## 2021-08-30 RX ADMIN — DOCUSATE SODIUM 100 MG: 100 CAPSULE, LIQUID FILLED ORAL at 21:38

## 2021-08-30 RX ADMIN — IBUPROFEN 800 MG: 800 TABLET, FILM COATED ORAL at 12:39

## 2021-08-30 RX ADMIN — IBUPROFEN 800 MG: 800 TABLET, FILM COATED ORAL at 02:58

## 2021-08-30 RX ADMIN — IBUPROFEN 800 MG: 800 TABLET, FILM COATED ORAL at 21:38

## 2021-08-30 NOTE — ADT AUTH CERT NOTES
[address]      FACILITY NPI : [id number]  FACILITY TAX ID :      [address]  [phone]            DEPT CONTACT:  Dedra Lema  #[phone]  [facility]#[phone]       Patient Name :Phylicia Holt   : 1985 (36 yrs)  MRN : [id number]     Patient Mailing Address [address] [place] [address] DR MICHAEL REDDY [[id number]] , [id number]                                                               .         Insurance Plan Payor: BLUE CROSS / Plan: [address] [place] [address] / Product Type: PPO /      Primary Coverage Subscriber ID : N/A     Secondary Coverage:  N/A     Secondary Subscriber ID :        Current Patient Class : INPATIENT  Admit Date : 2021     REQUESTED LEVEL OF CARE: INPATIENT [101]                                                           Diagnosis : Pregnant                          ICD10 Code : Pregnant [Z34.90]     Current Room and Bed 214/01     Admitting and Attending Info:  Admitting Provider : Sharon Ortiz MD   NPI: [id number]  Admitting Provider Phone.  [phone]  Admitting Provider Address: [place] [address]     Attending Provider Peri Aschoff, MD   [id number]  Attending Provider Address:  [facility] [place]in [place]                                                   [id number]     Attending Provider Phone: Thuy nielsen phone: [phone]            BABY INFORMATION :     Name :  Alvin Johnson    :   2021 (1 dy)      Delivery Type : Vaginal, Spontaneous [25*       Weight in Grams :   2660 g      GA  :   36      Apgar 1 Min :   7  [7]      Apgar 5 Min :   4  [4]      Unit:   [facility]  ICU     *SEPARATE AUTH SUBMITTED FOR COVERAGE AUTH OF NICU           MOM CHART--  Suyapa Contreras     MRN: [id number]   Link to Baby  Comment     Last edited by  on  at    Baby's name MRN Account  Age Sex Admission Type   Kennie Cowden, Alvin Smith [id number] 21 1 days M Confirmed Admission - L&D    OB History       3    Para   3    Term   2       1    AB   0    Living   3      SAB   0    TAB   0    Ectopic   0    Molar   0    Multiple   0    Live Births   3         # Outcome Date GA Labor/2nd Weight Sex Delivery Anes PTL Lv A1 A5   1 Term 14 37w1d  2.565 kg F  LO Spinal  N Living 7 9   Name: Jess Coe   Location: Other      2 Term 19 39w4d / 0h 33m 2.955 kg F VACD None N Living 8 9   Name: Peña Chamorro   Location: Other   Delivering Clinician: Francisco Boyce CNM      3  21 36w5d / 0h 09m 2.66 kg M Vag-Spont None N Living 7 4   Name: Peña Search   Location: Other   Delivering Clinician: Rafaela Menendez MD      Prenatal History     Most Recent Value   Did You Receive Prenatal Care Yes   Name Of OB Provider Vance   Seen By MFM (Maternal Fetal Medicine)? Yes   Fetal Ultrasound Abnormalities/Concerns? Yes  [polyhydraminos]   Infant Feeding Breast Milk   Circumcision Planned Yes   Pediatrician After Birth/ Follow Up Baby Visits [facility]   Dating Summary    Working WILLA: 21 based on Alternate WILLA Entry   Based On WILLA GA Diff GA User Date   Last Menstrual Period on 12/15/20 (Within Weeks) 21 Same  System action - copied 21   Alternate WILLA Entry  0 Comment: Date entered prior to episode creation 21 Working  System action - copied 21   Prenatal Results    Prenatal Labs    Test Value Date Time   ABO/Rh * O Positive  13    Antibody Scrn * Negative  21    Hgb      Hct      Platelets      Rubella * Immune  21    RPR * Non-reactive  21    T.  Pallidum Antibody      Urine * Groub B Strep  21    Hep B Surf Ag * Negative  21 Hep C      HIV * Non-reactive  21    Gonorrhea * Negative  21    Chlamydia * Negative  21    TSH      GTT, 1 HR (Glucola) * 106  21    GTT, Fasting      GTT, 1 HR      GTT, 2 HR      GTT, 3 HR      3rd Trimester    Test Value Date Time   Hgb      Hct      Platelets      Group B Strep * POSITIVE  21    Antibody Scrn      TSH      T. Pallidum Antibody      Hep B Surf Ag      Gonorrhea      Chlamydia       Screening/Diagnostics    Test Value Date Time   AFP Only * negative  21    AFP Tetra      Hgb Electrophoresis      Amniocentesis      Cystic Fibrosis      Thalessemia      Gaurang-Sac      Canavan      PAP Smear      Beta HCG      NT      NIPT * Low Risk XY  21    COVID-19      Lab    Test Value Date Time   GTT, Fasting      GTT, 1HR      GTT, 2HR      GTT, 3HR      RPR * Non-reactive  21    Beta HCG      CMV Ab      Toxoplasma Ab      Varicella Zoster Ab         Legend    *: Historical  View all results for this pregnancy   Wes Bee, Male Ramo Fay [[id number]]     Delivery    Birth date/time: 2021 17:38:00  Delivery type: Vaginal, Spontaneous  Labor Event Times    Dilation complete date/time: 2021 1729  Start pushing date/time: 2021 1735  Labor Events     labor?: No   steroids?: None  Cervical ripening type: None  Antibiotics during labor?: Yes  Rupture date/time: 2021 0300  Rupture type: AROM, SROM  Fluid color: Clear  Fluid odor: None  Induction: None  Augmentation: AROM, Oxytocin  Augmentation indications: Ineffective Contraction Pattern  Labor/Delivery complications: None  Delivery Providers    Delivering clinician: Barbie Greene MD  Provider Role   Barbie Greene MD Obstetrician   Joseph Wallace Primary Nurse   Ander Kern, RN Primary Jenkins Nurse   Keila Johnson, RN Charge Nurse   Michelle Stapleton, LATASHA Staff Nurse   Tanya Byrne Staff Nurse   Seven Camejo, LATASHA Staff Nurse Anesthesia    Method: None  Multiple Birth Onset Second Stage    Second Stage Start Date: 21 Second Stage Start Time: 3434   Operative Delivery    Forceps attempted?: No  Vacuum extractor attempted?: No  Presentation    Presentation: Vertex  Position: Right Occiput Anterior  Apgars    Living status: Living  Apgars:  1 min. :  5 min.:  10 min. :  15 min.:  20 min.:    Skin color:  0  0  1      Heart rate:  2  2  2      Reflex irritability:  1  1  2      Muscle tone:  2  0  2      Respiratory effort:  2  1  2      Total:  7  4  9      Apgars assigned by: Estela El  Resuscitation    Method: Suctioning-bulb, Tactile Stimulation  Shoulder Dystocia    Shoulder dystocia present?: No  Measurements    Weight: 2660 g  Weight (lbs): 5 lb 13.8 oz  Length: 44.5 cm  Length (in): 17.52\"  Head circumference: 33.5 cm  Chest circumference: 28.5 cm  Abdominal girth: 28.5 cm  Lacerations    Episiotomy: None    Lacerations: None  Repair Needed: None  # of Repair Packets:   Suture Type and Size:   Suture Comment:   Estimated Blood Loss (mL):     Placenta    Placenta Date/Time: 2021 1741  Removal: Expressed  Appearance: Normal Placenta disposition: Discarded   Vaginal Counts    Initial count personnel: T. [employer]  Final count personnel: T.  [address] [facility]  Accurate final count?: Yes  Skin to Skin    Reason skin to skin not initiated: [place] [employer]  Delivery Summary History    Event User Date/Time   Signed Aj Arias 2021 18:25:47

## 2021-08-30 NOTE — PROGRESS NOTES
8/30/2021  2:16 PM    CM met with MOB to complete initial assessment and begin discharge planning. MOB verified and confirmed demographics. MOB lives with spouse/FOB- Ari Calderon ( [phone]) along with FOB's 8 yr old and their 9, and 2yr old, at the address on file. MOB is employed and plans to take 10-12 wks off from work. FOB is also employed and will be taking adequate time off. MOB reports she has good family support. MOB plans to breast feed baby and has pump to use at home. [facility] will provide follow up care for infant. MOB has car seat, bassinet/crib, clothing, bottles and all necessary supplies for baby. MOB has [employer] and will be adding baby to this policy. CM discussed process to add baby to insurance, MOB verbalized understanding. MOB denied needing WIC/Medicaid services. CM discussed baby's admission to NICU. Baby: Alfredo Kim born on 8/29, GA:36w5d, BW: 2660gr.   Admited with decorticate positioning/seizure soon after birth. Baby remain in warmer for thermal support. CM will continue to follow for needs. Care Management Interventions  PCP Verified by CM: Yes  Mode of Transport at Discharge:  Other (see comment)  Transition of Care Consult (CM Consult): Discharge Planning  Current Support Network: Own Home, Family Lives Nearby, Lives with Spouse  Confirm Follow Up Transport: Family  Discharge Location  Discharge Placement: Home with family assistance  Paulina Trevino

## 2021-08-30 NOTE — PROGRESS NOTES
Post-Partum Progress Note  Late entry from rounding at ~1245 pm    Patient doing well post-partum without significant complaint. She is voiding without difficulty, she reports normal lochia. Her pain is well controlled with oral pain medication. She is tolerating a general diet. Delivery information:  Information for the patient's :  Flaco Guevara, Male Ladgely Balloon [[id number]]   Delivery of a 5 lb 13.8 oz (2.66 kg) male infant via Vaginal, Spontaneous on 2021 at 5:38 PM  by Kelli Hunter. Apgars were 7  and 4 . Vitals:  No data found. Temp (24hrs), Av.3 °F (36.8 °C), Min:97.8 °F (36.6 °C), Max:98.7 °F (37.1 °C)    Visit Vitals  /65   Pulse 98   Temp 97.8 °F (36.6 °C)   Resp 18   Ht 5' 2\" (1.575 m)   Wt 185 lb (83.9 kg)   SpO2 99%   Breastfeeding Unknown   BMI 33.84 kg/m²       Exam:    General: Patient without distress. Abdomen: soft, fundus firm at level of umbilicus, nontender  Lower extremities: negative for cords or tenderness. Labs:   Recent Results (from the past 24 hour(s))   DRUG SCREEN, URINE    Collection Time: 21  6:05 PM   Result Value Ref Range    AMPHETAMINES Negative NEG      BARBITURATES Negative NEG      BENZODIAZEPINES Negative NEG      COCAINE Negative NEG      METHADONE Negative NEG      OPIATES Negative NEG      PCP(PHENCYCLIDINE) Negative NEG      THC (TH-CANNABINOL) Negative NEG      Drug screen comment (NOTE)    COVID-19 RAPID TEST    Collection Time: 21  6:17 PM   Result Value Ref Range    Specimen source Nasopharyngeal      COVID-19 rapid test Not detected NOTD         Assessment:    1. Postpartum S/P   2. Patient doing well without significant complications  3. Baby in NICU    Plan:  1. Continue routine postpartum care  2. Routine perineal care and maternal education   3. Oral pain medications and bowel regimen as needed       4.  The risks and benefits of the circumcision  procedure and anesthesia including: bleeding, infection, variability of cosmetic results were discussed at length with the mother. She is aware that future repeat procedures may be necessary. She gives informed consent to proceed as noted and her questions are answered.      Chad Sánchez MD

## 2021-08-30 NOTE — LACTATION NOTE
This note was copied from a baby's chart. Experienced breastfeeding mother. Baby in NICU (born at 36.5 weeks). Mother has been pumping Q 2-3 hr for 20 minutes with symphony breast pump and getting up to 5 ml per pump session.) Mother has a medela pump for home use but may rent a pump. Mother states she was able to put baby to breast in NICU today for the first time and baby nursed well. Instructed mother to have staff call [facility] if she breastfeeds again. Infant admitted to NICU. Mother will successfully establish breast milk supply by pumping with a hospital grade pump every 2-3 hours for approximately 20 minutes/8-10 x day. To maximize milk production mom taught to incorporate breast massage before and hand expression after each pumping session. All expressed breast milk (EBM) will be provided for infant(s) use. The value of skin to skin bonding emphasized. The breast will be offered as baby is ready; with the goal of eventual transition to breastfeeding. Importance of maintaining milk supply through pumping emphasized as infant(s) learns to nurse. Discussed with mother her plan for feeding. Reviewed the benefits of exclusive breast milk feeding during the hospital stay. Informed her of the risks of using formula to supplement in the first few days of life as well as the benefits of successful breast milk feeding; referred her to the Breastfeeding booklet about this information. She acknowledges understanding of information reviewed and states that it is her plan to breastfeed/supplement (as order by baby's doctor) for her infant. Will support her choice and offer additional information as needed. Encouraged mom to attempt feeding with baby led feeding cues. Just as sucking on fingers, rooting, mouthing. Looking for 8-12 feedings in 24 hours. Don't limit baby at breast, allow baby to come of breast on it's own.  Baby may want to feed  often and may increase number of feedings on second day of life. Skin to skin encouraged. If baby doesn't nurse,  Mom should  hand express  10-20 drops of colostrum and drip into baby's mouth, or give to baby by finger feeding, cup feeding, or spoon feeding at least every 2-3 hours. Mother will successfully establish breastfeeding by feeding in response to early feeding cues   or wake every 3h, will obtain deep latch, and will keep log of feedings/output. Taught to BF at hunger cues and or q 2-3 hrs and to offer 10-20 drops of hand expressed colostrum at any non-feeds. Breast Assessment  Left Breast: Medium  Left Nipple: Everted, Intact  Right Breast: Medium  Right Nipple: Everted, Intact  Breast- Feeding Assessment  Attends Breast-Feeding Classes: No  Breast-Feeding Experience: Yes (Breast fed 1st baby x 3 months and 2nd baby x 12 months.)  Breast Trauma/Surgery: No  Type/Quality: Good (Per mother)  Lactation Consultant Visits  Breast-Feedings:  (Baby in NICU. Mother states she was able to breastfeed her baby for the 1st time today and he nursed well for 35 minutes on Left breast this morning.)      Mother given breastfeeding handouts and LC#.

## 2021-08-31 LAB
HSV1 IGG SER IA-ACNC: 28 INDEX (ref 0–0.9)
HSV1+2 IGM SER IA-ACNC: <0.91 RATIO (ref 0–0.9)
HSV2 IGG SER IA-ACNC: <0.91 INDEX (ref 0–0.9)
HSV2 IGG SER IA-ACNC: <0.91 INDEX (ref 0–0.9)

## 2021-08-31 PROCEDURE — 74011250637 HC RX REV CODE- 250/637: Performed by: OBSTETRICS & GYNECOLOGY

## 2021-08-31 RX ADMIN — IBUPROFEN 800 MG: 800 TABLET, FILM COATED ORAL at 13:44

## 2021-08-31 NOTE — PROGRESS NOTES
1900 Assumed care of pt at this time from 1 [facility], Manpreet Dickinson RN. Pt in NICU a this time. 2100 Pt remains in NICU, no complaints, will be returning to room shortly. 0700 Bedside shift change report given to Manpreet Dickinson RN (oncoming nurse) by EDISON Galvez RN (offgoing nurse). Report included the following information SBAR, Kardex, MAR, Recent Results and Med Rec Status.

## 2021-08-31 NOTE — PROGRESS NOTES
Post-Partum Progress Note    Patient doing well post-partum without significant complaint. She is voiding without difficulty, she reports normal lochia. Her pain is well controlled with oral pain medication. She is tolerating a general diet. Delivery information:  Information for the patient's :  Tasia Baugh, Male Pooja Samson [[id number]]   Delivery of a 5 lb 13.8 oz (2.66 kg) male infant via Vaginal, Spontaneous on 2021 at 5:38 PM  by Lorene Wiseman. Apgars were 7  and 4 . Vitals:  No data found. Temp (24hrs), Av °F (36.7 °C), Min:98 °F (36.7 °C), Max:98 °F (36.7 °C)    Visit Vitals  /86   Pulse 86   Temp 98 °F (36.7 °C)   Resp 16   Ht 5' 2\" (1.575 m)   Wt 185 lb (83.9 kg)   SpO2 98%   Breastfeeding Unknown   BMI 33.84 kg/m²       Exam:    General: Patient without distress. Mother breast feeding in NICU  Abdomen: soft, fundus firm at level of umbilicus, nontender  Lower extremities: negative for cords or tenderness trace le edema bl    Labs: No results found for this or any previous visit (from the past 24 hour(s)). Assessment:    1. Postpartum S/P PT   2. Patient doing well without significant complications  3. Baby doing well, in NICU    Plan:  1. Continue routine postpartum care  2. Routine perineal care and maternal education   3.  Oral pain medications and bowel regimen as needed           Cecilia Berrios MD

## 2021-08-31 NOTE — DISCHARGE INSTRUCTIONS
[address] [facility]  [email or web address]  [phone]    Jose Coyne MD, FACOG     POST DELIVERY DISCHARGE INSTRUCTIONS  FROM YOUR PHYSICIAN    Name: Alexei Bashir  YOB: 1985    General:     Read all discharge information provided by the hospital    Diet/Diet Restrictions:  Eat healthy meals and snacks as desired. Eat foods that are high in fiber and low in fat and cholesterol. Drink eight 8-ounce glasses of water daily; avoid excessive caffeine intake. [email or web address]    Medications:   See discharge medication list and read instructions carefully. Breast Feeding:  See instructions from your lactation consult. Call [phone] for more information or to locate a lactation consultant. [email or web address]    Vaccines:  If you received the MMR vaccine postpartum you should wait three months until you get pregnant again. You, and close contacts, should make sure that the Tdap vaccine is up to date. This vaccine can decrease the risk of your baby getting pertussis or \"whooping cough. \"    You, and close contacts, should receive the influenza vaccine during flu season when appropriate. [email or web address]    Tobacco Use: If you (or other people around the baby) smoke or use tobacco products, please try to  use and quit to improve your health and decrease risk to your baby. [email or web address] htm    Swelling in your Legs:  There are many fluid changes after delivery and you may have more swelling the first few days after delivery  Continue to drink plenty of water, avoid sitting or standing in one position for too long and elevate your feet above your heart, to help reduce some the pressure you may be feeling in your ankles and legs.       Physical Activity / Restrictions / Safety:     Avoid heavy lifting, no more that 10 pounds, for 2-3 weeks. No driving while taking narcotic pain medication, of if you can not slam on the brakes. No intercourse for 4-6 weeks, no douching or tampon use until seen by your doctor for your postpartum visit. Use condoms as needed for contraception with sexual activity. You may resume normal exercise after you are cleared by your physician at your postpartum check. You may walk for exercise, as tolerated. Discharge Instructions/Special Treatment/Home Care Needs:     Continue your prenatal vitamins while breast feeding or pumping. Continue to use a squirt bottle with warm water on your perineum/bottom/episiotomy after each bathroom use until bleeding stops. Take stool softeners daily. For example, docusate over the counter stool softener. This is especially important if you are taking narcotic pain medications, because they can cause constipation. Call your doctor for the following: If you have a fever over 100.4 degrees by mouth on two readings. If you have persistent vaginal bleeding heavier than a heavy menstrual period or persistent large clots or if you are bleeding so heavy it is making you feel weak. It is normal to pass larger clots when you first get out of bed: but if they persist, notify your physician. If you have red streaks or increased swelling of legs, painful red streaks on your breast.  If you have painful urination, or increased pain, redness or discharge with your incision. If you have any questions or concerns. Pain Management:     Take Acetaminophen (Tylenol), Ibuprofen (Advil, Motrin), prescribed pain medications as directed for pain. Do not take Perocet with Tylenol, they both contain acetaminophen. Use a warm water Sitz bath 3 times daily to relieve episiotomy, bottom/perineum or hemorrhoidal discomfort. Apply heating pad to  incision as needed.    For hemorrhoidal discomfort, you can use Tucks and Anusol cream as needed and directed. NSAID information for patients:  [email or web address]    Pain medication/narcotic information for patients:   Take your medicine exactly as prescribed   Store your medicine away from children and in a safe  place   Do not give your medicine to others   Do not drink alcohol while taking this medicine    Follow-Up Care:     Appointment with MD:  Dr. Lakeisha Matias  [phone]  Schedule your postpartum visit for six weeks        Additional Discharge Instructions     Please read all of your discharge instructions   Follow all of your medication instructions carefully   Call our office on the next business day to schedule your follow-up appointment   If you have any questions or concerns, please contact us at [phone] or if the situation is urgent contact [phone]   Become a [address] My Chart user so you can access information, results and appointments: go to [email or web address]. You may receive a survey about your delivery. Please take a minute to give us your feedback, and we hope that you were fully satisfied with your care. If you did not receive excellent communication, compassionate care and an outstanding patient experience, please notify Christa Carlson, the practice manger, or myself at [phone] or discuss your concerns with me at your next visit so that we can always meet our mission and your expectations. Thank you.   Dr. Domenico Ewing MD  [address], [address]  [email or web address]   [phone]   Good Help to Those in [facility]

## 2021-08-31 NOTE — LACTATION NOTE
This note was copied from a baby's chart. Baby in NICU. Mother in to visit baby and breast feed him. Baby latched on and breast fed for 10 minutes on right breast then suckled on and off left breast for 5 minutes. Mother able to hand express lots of drops into baby's mouth to entice him to suckle. Mother has been pumping Q 2-3 hr when baby does not breast feed. Reviewed breastfeeding basics:  Supply and demand,  stomach size, early  Feeding cues, skin to skin, positioning and baby led latch-on, assymetrical latch with signs of good, deep latch vs shallow, feeding frequency and duration, and log sheet for tracking infant feedings and output. Breastfeeding Booklet and Warm line information given. Discussed typical  weight loss and the importance of infant weight checks with pediatrician 1-2 post discharge. Engorgement Care Guidelines:  Reviewed how milk is made and normal phases of milk production. Taught care of engorged breasts - frequent breastfeeding encouraged, cool packs and motrin as tolerated. Anticipatory guidance shared. Pt will successfully establish breastfeeding by feeding in response to early feeding cues   or wake every 3h, will obtain deep latch, and will keep log of feedings/output. Taught to BF at hunger cues and or q 2-3 hrs and to offer 10-20 drops of hand expressed colostrum at any non-feeds. Breast Assessment  Left Breast: Medium (Breasts are full - milk came in today)  Left Nipple: Everted, Intact  Right Breast: Medium  Right Nipple: Everted, Intact  Breast- Feeding Assessment  Attends Breast-Feeding Classes: No  Breast-Feeding Experience: Yes  Breast Trauma/Surgery: No  Type/Quality: Good  Lactation Consultant Visits  Breast-Feedings: Good  (Baby breast fed for 10 minutes on right  breast and then was put to left breast baby suckled on and off for 5 minutes then fell asleep. LC saw baby on L side)  Mother/Infant Observation  Mother Observation: Alignment, Holds breast, Breast comfortable, Close hold, Nipple round on release  Infant Observation: Audible swallows, Lips flanged, upper, Opens mouth, Rhythmic suck, Latches nipple and aereolae, Lips flanged, lower  LATCH Documentation  Latch: Repeated attempts, hold nipple in mouth, stimulate to suck  Audible Swallowing: A few with stimulation  Type of Nipple: Everted (after stimulation)  Comfort (Breast/Nipple): Soft/non-tender  Hold (Positioning): No assist from staff, mother able to position/hold infant  LATCH Score: 8

## 2021-09-09 ENCOUNTER — TELEPHONE (OUTPATIENT)
Dept: OBGYN CLINIC | Age: 36
End: 2021-09-09

## 2021-09-09 NOTE — TELEPHONE ENCOUNTER
Message left at 10:37am      29year old delivered 2021       [facility] medical calling to confirm the ordering MD.     This nurse called [place] back to confirm the ordering MD      Patient reference number is [id number]

## 2021-09-15 NOTE — TELEPHONE ENCOUNTER
Call received at 11:05am    Juan winters calling back again to confirm the ordering pre scriber for the patients breast pump    This nurse confirmed the ordering pre scriber for the patients breast pump for a second time and confirmed the office fax number

## 2021-09-22 ENCOUNTER — TELEPHONE (OUTPATIENT)
Dept: OBGYN CLINIC | Age: 36
End: 2021-09-22

## 2021-09-22 NOTE — TELEPHONE ENCOUNTER
Call received at 11:03am      29year old patient delivered on 8/29/2021    Tone landa with the patient short term disability calling to ask when the patient delivered    This nurse advised of need to sent a fax to request the information .     Office fax number provided for

## 2021-10-19 NOTE — PATIENT INSTRUCTIONS
Next Appt 12/9/21  Last Appt 11/9/20    Refill request for   Disp Refills Start End     metoPROLOL tartrate (LOPRESSOR) 25 MG tablet 180 tablet 1 4/20/2021     Sig - Route: Take 1 tablet by mouth 2 times daily. - Oral      Disp Refills Start End     hydrochlorothiazide (MICROZIDE) 12.5 MG capsule 90 capsule 1 4/20/2021     Sig - Route: Take 1 capsule by mouth daily. - Oral       Disp Refills Start End    omeprazole (PrilOSEC) 20 MG capsule 180 capsule 3 4/20/2021     Sig: Take 1 capsule by mouth 2 times daily          Refilled per standing med protocol.     Weeks 22 to 26 of Your Pregnancy: Care Instructions  Overview     As you enter your 7th month of pregnancy at week 26, your baby's lungs are growing stronger and getting ready to breathe. You may notice that your baby responds to the sound of your or your partner's voice. You may also notice that your baby does less turning and twisting and more squirming, kicking, or jerking. Jerking often means that your baby has hiccups. Hiccups are normal and are only temporary. You may want to think about attending a childbirth preparation class. This is also a good time to start thinking about whether you want to have pain medicine during labor. Most pregnant women are tested for gestational diabetes between weeks 25 and 28. Gestational diabetes occurs when your blood sugar level gets too high when you're pregnant. The test is important, because you can have gestational diabetes and not know it. But the condition can cause problems for your baby. Follow-up care is a key part of your treatment and safety. Be sure to make and go to all appointments, and call your doctor if you are having problems. It's also a good idea to know your test results and keep a list of the medicines you take. How can you care for yourself at home? Ease discomfort from your baby's kicking  · Change your position. Sometimes this will cause your baby to change position too. · Take a deep breath while you raise your arm over your head. Then breathe out while you drop your arm. Do Kegel exercises to prevent urine from leaking  · You can do Kegel exercises while you stand or sit. ? Squeeze the same muscles you would use to stop your urine. Your belly and thighs should not move. ? Hold the squeeze for 3 seconds, and then relax for 3 seconds. ? Start with 3 seconds. Then add 1 second each week until you are able to squeeze for 10 seconds. ? Repeat the exercise 10 to 15 times for each session. Do three or more sessions each day.   Ease or reduce swelling in your feet, ankles, hands, and fingers  · If your fingers are puffy, take off your rings. · Do not eat high-salt foods, such as potato chips. · Prop up your feet on a stool or couch as much as possible. Sleep with pillows under your feet. · Do not stand for long periods of time or wear tight shoes. · Wear support stockings. Where can you learn more? Go to http://www.fox.com/  Enter G264 in the search box to learn more about \"Weeks 22 to 26 of Your Pregnancy: Care Instructions. \"  Current as of: October 8, 2020               Content Version: 12.8  © 6683-7480 [employer]. Care instructions adapted under license by [employer] (which disclaims liability or warranty for this information). If you have questions about a medical condition or this instruction, always ask your healthcare professional. [place] [address] any warranty or liability for your use of this information. Learning About Screening for Gestational Diabetes  What is gestational diabetes screening? Screening for gestational diabetes is a way to look for high blood sugar during pregnancy. You drink some very sweet liquid. Then you have a blood test to see how your body uses sugar (glucose). How is gestational diabetes screening done? Screening for gestational diabetes may be done in a couple of ways. Two-part screening. Part one (glucose challenge test): A blood sample is taken after you drink a liquid that contains sugar (glucose). You don't need to stop eating or drinking before this test. If the test shows that you don't have a lot of sugar in your blood, you don't have gestational diabetes. Part two (oral glucose tolerance test, or OGTT): If the first test shows a lot of sugar in your blood, then you may have an OGTT. You can't eat or drink for at least 8 hours before this test. A blood sample is taken, then you drink a sweet liquid.  You have more blood tests after 1 to 3 hours. If the OGTT shows that you have a lot of sugar in your blood, you may have gestational diabetes. One-part screening. Sometimes doctors use the OGTT on its own. If the test shows that you don't have a lot of sugar in your blood, you don't have gestational diabetes. If you do have a lot of sugar in your blood, you may have the condition. What are the risks of screening? Your blood glucose level may drop very low toward the end of the test. If this happens, you may feel weak, hungry, and restless. Tell your doctor if you have these symptoms. The test usually will be stopped. You may vomit after drinking the sweet liquid. If this happens, you may need to take the test at a later time. Your doctor may do more glucose tests at other times during your pregnancy. Follow-up care is a key part of your treatment and safety. Be sure to make and go to all appointments, and call your doctor if you are having problems. It's also a good idea to know your test results and keep a list of the medicines you take. Where can you learn more? Go to http://www.gray.com/  Enter A472 in the search box to learn more about \"Learning About Screening for Gestational Diabetes. \"  Current as of: August 31, 2020               Content Version: 12.8  © 2006-2021 Healthwise, Incorporated. Care instructions adapted under license by [employer] (which disclaims liability or warranty for this information). If you have questions about a medical condition or this instruction, always ask your healthcare professional. [place] [address] any warranty or liability for your use of this information. No

## 2022-01-04 NOTE — PATIENT INSTRUCTIONS
"PULMONOLOGY PROGRESS NOTE    Date of Admission: 12/28/2021    CC/Reason for Hospital visit: COPD, pneumonia, respiratory failure  SUBJECTIVE      Events of last night reviewed. Declining status noted. Family considering Hospice/comfort care.    ROS: A Problem Pertinent review of systems was negative except for items noted in HPI.  Past Medical, Family, and Social/Substance History has been reviewed: No interval changes.    OBJECTIVE   Vital signs:  Temp: 98.7  F (37.1  C) Temp src: Axillary BP: 106/51 Pulse: 115   Resp: 20 SpO2: 92 % O2 Device: Oxymask Oxygen Delivery: 2 LPM Height: 160 cm (5' 3\") Weight: 56.4 kg (124 lb 5.4 oz)  Estimated body mass index is 22.03 kg/m  as calculated from the following:    Height as of this encounter: 1.6 m (5' 3\").    Weight as of this encounter: 56.4 kg (124 lb 5.4 oz).        I/O last 3 completed shifts:  In: 0   Out: 800 [Urine:800]      CONSTITUTIONAL/GENERAL APPEARANCE: Alert female. No Apparent Distress.  PSYCHIATRIC: Pleasant and appropriate mood and affect. Oriented x 3.  EARS, NOSE,THROAT,MOUTH: External ears and nose overall normal. Normal oral mucosa.   NECK: Neck appearance normal. No neck masses and the thyroid is not enlarged.   RESPIRATORY: Non-labored effort. Decreased BS bilaterally.  CARDIOVASCULAR: S1, S2, regular rate and rhythm.      LABORATORY ASSESSMENT    Arterial Blood Gas  Recent Labs   Lab 12/31/21  0523 12/31/21  0146 12/30/21  1738 12/29/21  1700   PH 7.33* 7.35 7.30* 7.38   PCO2 88* 85* 92* 78*   PO2 134* 43* 84 105   HCO3 47* 47* 45* 46*   O2PER 50 35 4 24     CBC  Recent Labs   Lab 01/02/22  1213 01/01/22  0756 12/31/21  0514 12/30/21  0558   WBC 13.8* 12.0* 11.3* 14.2*   RBC 2.96* 3.48* 3.02* 3.28*   HGB 8.2* 9.6* 8.3* 9.1*   HCT 32.3* 35.7 31.4* 33.7*   * 103* 104* 103*   MCH 27.7 27.6 27.5 27.7   MCHC 25.4* 26.9* 26.4* 27.0*   RDW 13.1 13.4 13.2 13.0    232 269 290     [facility]  Recent Labs   Lab 01/02/22  1213 01/01/22  0756 " Learning About When to Call Your Doctor During Pregnancy (Up to 20 Weeks) Your Care Instructions It's common to have concerns about what might be a problem during pregnancy. Although most pregnant women don't have any serious problems, it's important to know when to call your doctor if you have certain symptoms. These are general suggestions. Your doctor may give you some more information about when to call. When to call your doctor (up to 20 weeks) Call 911 anytime you think you may need emergency care. For example, call if: 
· You passed out (lost consciousness). Call your doctor now or seek immediate medical care if: 
· You have a fever. · You have vaginal bleeding. · You are dizzy or lightheaded, or you feel like you may faint. · You have symptoms of a urinary tract infection. These may include: 
? Pain or burning when you urinate. ? A frequent need to urinate without being able to pass much urine. ? Pain in the flank, which is just below the rib cage and above the waist on either side of the back. ? Blood in your urine. · You have belly pain. · You think you are having contractions. · You have a sudden release of fluid from your vagina. Watch closely for changes in your health, and be sure to contact your doctor if: 
· You have vaginal discharge that smells bad. · You have other concerns about your pregnancy. Follow-up care is a key part of your treatment and safety. Be sure to make and go to all appointments, and call your doctor if you are having problems. It's also a good idea to know your test results and keep a list of the medicines you take. Where can you learn more? Go to http://laith-franklin.info/. Enter X462 in the search box to learn more about \"Learning About When to Call Your Doctor During Pregnancy (Up to 20 Weeks). \" 
Current as of: November 21, 2017 Content Version: 11.8 © 0333-0906 [employer].  Care instructions adapted under 12/31/21  0514 12/30/21  0558   * 146* 146* 142   POTASSIUM 4.2 4.3 4.1 4.9   CHLORIDE 104 106 103 100   OLI 9.1 8.9 9.1 9.3   CO2 >45* 42* 45* >45*   BUN 27 34* 37* 38*   CR 0.54 0.53 0.69 0.60   * 109* 130* 143*     INRNo lab results found in last 7 days.   BNPNo lab results found in last 7 days.  VENOUS BLOOD GASES  Recent Labs   Lab 12/28/21  1649 12/28/21  1515 12/28/21  1206   PHV 7.21* 7.24* 7.21*   PCO2V 123* 111* 122*   PO2V 15* 31 23*   HCO3V 49* 48* 49*   TIFFANY 16.7* 16.7* 17.0*         Additional labs and/or comments:    IMAGING      CXR 1/3 -  IMPRESSION: New trace bilateral pleural effusions, left greater right.  New infiltrate or atelectasis in the left lower lobe compared to  previous. Lungs are otherwise hyperinflated with strands of linear  scarring or atelectasis in both lungs, similar to previous.    CXR 1/1 -  IMPRESSION: Emphysematous changes and areas of bilateral pulmonary scarring appear stable. No new acute airspace disease identified. Normal cardiac silhouette.    CT Chest 12/29 -  IMPRESSION:   1.  Advanced bullous emphysema. Mild right middle lobe bronchiectasis  with partial atelectasis.  2.  Left upper lobe spiculated 9 mm nodule and left lower lobe  spiculated 7 mm nodule are indeterminate but suspicious for  malignancy. A few other additional scattered patchy and linear  consolidative opacities could represent atelectasis and/or pneumonia, however malignancy is not entirely excluded. The largest is a 21 x 10 mm opacity in the left lower lobe at the lung bases. Consider follow up CT or PET-CT in about 3 months.   3.  Trace left pleural effusion.    PFT & OTHER TESTING       ASSESSMENT / PLAN      Pulmonary Diagnoses:  Abnl CT/CXR R91.8  COPD exacerb J44.1  Hypoxemia R09.02  Pneumonia unspec J18.9  Pulm HTN second I27.2  Resp fail acute J96.00  Resp fail chronic J96.10  SOB R06.02    Additional COVID-19 diagnoses:  Concern of possible exposure to COVID-19, Now RULED OUT  license by [address] (which disclaims liability or warranty for this information). If you have questions about a medical condition or this instruction, always ask your healthcare professional. [address] any warranty or liability for your use of this information. Weeks 6 to 10 of Your Pregnancy: Care Instructions Your Care Instructions Congratulations on your pregnancy. This is an exciting and important time for you. During the first 6 to 10 weeks of your pregnancy, your body goes through many changes. Your baby grows very fast, even though you cannot feel it yet. You may start to notice that you feel different, both in your body and your emotions. Because each woman's pregnancy is unique, there is no right way to feel. You may feel the healthiest you have ever been, or you may feel tired or sick to your stomach (\"morning sickness\"). These early weeks are a time to make healthy choices and to eat the best foods for you and your baby. This care sheet will give you some ideas. This is also a good time to think about birth defects testing. These are tests done during pregnancy to look for possible problems with the baby. First trimester tests for birth defects can be done between 8 and 17 weeks of pregnancy, depending on the test. Talk with your doctor about what kinds of tests are available. Follow-up care is a key part of your treatment and safety. Be sure to make and go to all appointments, and call your doctor if you are having problems. It's also a good idea to know your test results and keep a list of the medicines you take. How can you care for yourself at home? Eat well · Eat at least 3 meals and 2 healthy snacks every day. Eat fresh, whole foods, including: 
? 7 or more servings of bread, tortillas, cereal, rice, pasta, or oatmeal. 
? 3 or more servings of vegetables, especially leafy green vegetables. ? 2 or more servings of fruits. ? 3 or more servings of milk, yogurt, or cheese. ? 2 or more servings of meat, turkey, chicken, fish, eggs, or dried beans. · Drink plenty of fluids, especially water. Avoid sodas and other sweetened drinks. · Choose foods that have important vitamins for your baby, such as calcium, iron, and folate. ? Dairy products, tofu, canned fish with bones, almonds, broccoli, dark leafy greens, corn tortillas, and fortified orange juice are good sources of calcium. ? Beef, poultry, liver, spinach, lentils, dried beans, fortified cereals, and dried fruits are rich in iron. ? Dark leafy greens, broccoli, asparagus, liver, fortified cereals, orange juice, peanuts, and almonds are good sources of folate. · Avoid foods that could harm your baby. ? Do not eat raw or undercooked meat, chicken, or fish (such as sushi or raw oysters). ? Do not eat raw eggs or foods that contain raw eggs, such as Caesar dressing. ? Do not eat soft cheeses and unpasteurized dairy foods, such as Brie, feta, or blue cheese. ? Do not eat fish that contains a lot of mercury, such as shark, swordfish, tilefish, or shalonda mackerel. Do not eat more than 6 ounces of tuna each week. ? Do not eat raw sprouts, especially alfalfa sprouts. ? Cut down on caffeine, such as coffee, tea, and cola. Protect yourself and your baby · Do not touch felicia litter or cat feces. They can cause an infection that could harm your baby. · High body temperature can be harmful to your baby. So if you want to use a sauna or hot tub, be sure to talk to your doctor about how to use it safely. [place] with morning sickness · Sip small amounts of water, juices, or shakes. Try drinking between meals, not with meals. · Eat 5 or 6 small meals a day. Try dry toast or crackers when you first get up, and eat breakfast a little later. · Avoid spicy, greasy, and fatty foods. · When you feel sick, open your windows or go for a short walk to get fresh air. Z03.818    ASSESSMENT: 74-year-old female with end-stage oxygen dependent COPD admitted for pneumonia and COPD exacerbation complicated by acute on chronic hypoxemic and hypercarbic respiratory failure.  CT scan of the chest showed advanced bullous emphysema with right middle lobe bronchiectasis and partial atelectasis and spiculated left lung nodules concerning for malignancy.  Patient does not want to pursue further evaluation of the pulmonary nodules.  Hospital course complicated by ongoing respiratory acidosis and poor tolerance of BiPAP, largely secondary to acute encephalopathy and psychosis.  Chest x-ray yesterday showed new trace bilateral pleural effusions and new infiltrate versus atelectasis in the left lower lobe.  Given end-stage disease and progressive respiratory failure despite maximal medical therapy, it would appear appropriate to consider a hospice/comfort care approach.  Continue present supportive therapy for now pending further decisions from the patient's family.    PLAN:  1. Adjust oxygen, keep SaO2 89-92% (baseline Home O2 3.5 L/NC).  2. Encourage BiPAP as able.  3. Bronchodilators - Xopenex + Atrovent nebs, Incruse  4. Consider Hospice / comfort care.    No further suggestions at this time. Will sign off, please call if needed.      Brady Hogan M.D.    [facility]  [phone]   (pager)  [phone]   (office)       · Try nausea wristbands. These help some women. · Tell your doctor if you think your prenatal vitamins make you sick. Where can you learn more? Go to http://laith-franklin.info/. Enter G112 in the search box to learn more about \"Weeks 6 to 10 of Your Pregnancy: Care Instructions. \" Current as of: November 21, 2017 Content Version: 11.8 © 9011-7338 [employer]. Care instructions adapted under license by [employer] (which disclaims liability or warranty for this information). If you have questions about a medical condition or this instruction, always ask your healthcare professional. [address] any warranty or liability for your use of this information.

## 2022-02-15 LAB
ANTIBODY SCREEN, EXTERNAL: NEGATIVE
CHLAMYDIA, EXTERNAL: NEGATIVE
HBSAG, EXTERNAL: NEGATIVE
HIV, EXTERNAL: NON REACTIVE
N. GONORRHEA, EXTERNAL: NEGATIVE
RUBELLA, EXTERNAL: NORMAL
T. PALLIDUM, EXTERNAL: NON REACTIVE
TYPE, ABO & RH, EXTERNAL: NORMAL

## 2022-03-18 PROBLEM — F41.9 ANXIETY: Status: ACTIVE | Noted: 2018-11-30

## 2022-03-18 PROBLEM — F32.A DEPRESSION: Status: ACTIVE | Noted: 2021-05-17

## 2022-03-18 PROBLEM — Z34.90 PREGNANT: Status: ACTIVE | Noted: 2021-08-29

## 2022-03-18 PROBLEM — B00.9 HSV-2 INFECTION: Status: ACTIVE | Noted: 2018-11-30

## 2022-03-19 PROBLEM — O09.529 SUPERVISION OF MULTIGRAVIDA OF ADVANCED MATERNAL AGE, ANTEPARTUM: Status: ACTIVE | Noted: 2021-02-11

## 2022-03-19 PROBLEM — Z98.891 HX OF CESAREAN SECTION: Status: ACTIVE | Noted: 2018-11-20

## 2022-03-19 PROBLEM — Z98.891 H/O: C-SECTION: Status: ACTIVE | Noted: 2021-05-26

## 2022-03-20 PROBLEM — O34.219 HX SUCCESSFUL VBAC (VAGINAL BIRTH AFTER CESAREAN), CURRENTLY PREGNANT: Status: ACTIVE | Noted: 2021-05-26

## 2022-03-20 PROBLEM — Z98.890 H/O LEEP: Status: ACTIVE | Noted: 2021-05-26

## 2022-07-05 LAB — GTT, 1 HR, GLUCOLA, EXTERNAL: 117

## 2022-07-29 NOTE — PROGRESS NOTES
-- DO NOT REPLY / DO NOT REPLY ALL --  -- Message is from the [facility]--    General Patient Message      Reason for Call: Patient was told to inform doctor you when she drops off the stool specimen, she will be  dropping it off today around 10 AM to the lab    Caller Information       Type Contact Phone/Fax    07/29/2022 09:03 AM CDT Phone (Incoming) Whit Culver (Self) [phone] (M)          Alternative phone number: [phone] (spouse)    Turnaround time given to caller:   \"This message will be sent to [state Provider's name]. The clinical team will fulfill your request as soon as they review your message.\"     Discharged with instructions. Pt to remain as a boarder as baby still cared for by NICU. Pt verbalized understanding of same.

## 2022-08-03 RX ORDER — SODIUM CHLORIDE 9 MG/ML
25 INJECTION, SOLUTION INTRAVENOUS CONTINUOUS
Status: DISPENSED | OUTPATIENT
Start: 2022-08-10 | End: 2022-08-10

## 2022-08-05 RX ORDER — SODIUM CHLORIDE 9 MG/ML
25 INJECTION, SOLUTION INTRAVENOUS CONTINUOUS
Status: DISPENSED | OUTPATIENT
Start: 2022-08-12 | End: 2022-08-12

## 2022-08-08 RX ORDER — SODIUM CHLORIDE 9 MG/ML
25 INJECTION, SOLUTION INTRAVENOUS CONTINUOUS
Status: DISPENSED | OUTPATIENT
Start: 2022-08-15 | End: 2022-08-15

## 2022-08-10 ENCOUNTER — HOSPITAL ENCOUNTER (OUTPATIENT)
Dept: INFUSION THERAPY | Age: 37
Discharge: HOME | End: 2022-08-10

## 2022-08-10 RX ORDER — SODIUM CHLORIDE 9 MG/ML
25 INJECTION, SOLUTION INTRAVENOUS CONTINUOUS
Status: DISPENSED | OUTPATIENT
Start: 2022-08-17 | End: 2022-08-17

## 2022-08-11 ENCOUNTER — HOSPITAL ENCOUNTER (OUTPATIENT)
Dept: INFUSION THERAPY | Age: 37
End: 2022-08-11

## 2022-08-12 ENCOUNTER — HOSPITAL ENCOUNTER (OUTPATIENT)
Dept: INFUSION THERAPY | Age: 37
Discharge: HOME | End: 2022-08-12

## 2022-08-15 ENCOUNTER — HOSPITAL ENCOUNTER (OUTPATIENT)
Dept: INFUSION THERAPY | Age: 37
Discharge: HOME | End: 2022-08-15

## 2022-08-17 ENCOUNTER — HOSPITAL ENCOUNTER (OUTPATIENT)
Dept: INFUSION THERAPY | Age: 37
Discharge: HOME | End: 2022-08-17

## 2022-08-18 ENCOUNTER — HOSPITAL ENCOUNTER (EMERGENCY)
Age: 37
Discharge: HOME | End: 2022-08-18
Attending: OBSTETRICS & GYNECOLOGY | Admitting: OBSTETRICS & GYNECOLOGY
Payer: COMMERCIAL

## 2022-08-18 VITALS
HEART RATE: 102 BPM | TEMPERATURE: 98 F | OXYGEN SATURATION: 97 % | DIASTOLIC BLOOD PRESSURE: 77 MMHG | SYSTOLIC BLOOD PRESSURE: 121 MMHG | RESPIRATION RATE: 16 BRPM

## 2022-08-18 LAB
ALBUMIN SERPL-MCNC: 2.5 G/DL (ref 3.5–5)
ALBUMIN/GLOB SERPL: 0.8 {RATIO} (ref 1.1–2.2)
ALP SERPL-CCNC: 172 U/L (ref 45–117)
ALT SERPL-CCNC: 45 U/L (ref 12–78)
ANION GAP SERPL CALC-SCNC: 9 MMOL/L (ref 5–15)
AST SERPL-CCNC: 40 U/L (ref 15–37)
BASOPHILS # BLD: 0 K/UL (ref 0–0.1)
BASOPHILS NFR BLD: 0 % (ref 0–1)
BILIRUB SERPL-MCNC: 0.2 MG/DL (ref 0.2–1)
BUN SERPL-MCNC: 6 MG/DL (ref 6–20)
BUN/CREAT SERPL: 12 (ref 12–20)
CALCIUM SERPL-MCNC: 8.7 MG/DL (ref 8.5–10.1)
CHLORIDE SERPL-SCNC: 108 MMOL/L (ref 97–108)
CO2 SERPL-SCNC: 21 MMOL/L (ref 21–32)
COMMENT, HOLDF: NORMAL
COVID-19 RAPID TEST, COVR: NOT DETECTED
CREAT SERPL-MCNC: 0.52 MG/DL (ref 0.55–1.02)
CREAT UR-MCNC: 166 MG/DL
DIFFERENTIAL METHOD BLD: ABNORMAL
EOSINOPHIL # BLD: 0.1 K/UL (ref 0–0.4)
EOSINOPHIL NFR BLD: 1 % (ref 0–7)
ERYTHROCYTE [DISTWIDTH] IN BLOOD BY AUTOMATED COUNT: 24.2 % (ref 11.5–14.5)
GLOBULIN SER CALC-MCNC: 3.3 G/DL (ref 2–4)
GLUCOSE SERPL-MCNC: 101 MG/DL (ref 65–100)
HCT VFR BLD AUTO: 32.2 % (ref 35–47)
HGB BLD-MCNC: 9.5 G/DL (ref 11.5–16)
IMM GRANULOCYTES # BLD AUTO: 0.1 K/UL (ref 0–0.04)
IMM GRANULOCYTES NFR BLD AUTO: 1 % (ref 0–0.5)
LYMPHOCYTES # BLD: 0.9 K/UL (ref 0.8–3.5)
LYMPHOCYTES NFR BLD: 11 % (ref 12–49)
MCH RBC QN AUTO: 22.5 PG (ref 26–34)
MCHC RBC AUTO-ENTMCNC: 29.5 G/DL (ref 30–36.5)
MCV RBC AUTO: 76.1 FL (ref 80–99)
MONOCYTES # BLD: 0.3 K/UL (ref 0–1)
MONOCYTES NFR BLD: 4 % (ref 5–13)
NEUTS SEG # BLD: 6.7 K/UL (ref 1.8–8)
NEUTS SEG NFR BLD: 83 % (ref 32–75)
NRBC # BLD: 0.02 K/UL (ref 0–0.01)
NRBC BLD-RTO: 0.2 PER 100 WBC
PLATELET # BLD AUTO: 117 K/UL (ref 150–400)
POTASSIUM SERPL-SCNC: 4.2 MMOL/L (ref 3.5–5.1)
PROT SERPL-MCNC: 5.8 G/DL (ref 6.4–8.2)
PROT UR-MCNC: 29 MG/DL (ref 0–11.9)
PROT/CREAT UR-RTO: 0.2
RBC # BLD AUTO: 4.23 M/UL (ref 3.8–5.2)
RBC MORPH BLD: ABNORMAL
SAMPLES BEING HELD,HOLD: NORMAL
SODIUM SERPL-SCNC: 138 MMOL/L (ref 136–145)
SOURCE, COVRS: NORMAL
WBC # BLD AUTO: 8.1 K/UL (ref 3.6–11)

## 2022-08-18 PROCEDURE — 84156 ASSAY OF PROTEIN URINE: CPT

## 2022-08-18 PROCEDURE — 74011250636 HC RX REV CODE- 250/636: Performed by: OBSTETRICS & GYNECOLOGY

## 2022-08-18 PROCEDURE — 36415 COLL VENOUS BLD VENIPUNCTURE: CPT

## 2022-08-18 PROCEDURE — 87635 SARS-COV-2 COVID-19 AMP PRB: CPT

## 2022-08-18 PROCEDURE — 85025 COMPLETE CBC W/AUTO DIFF WBC: CPT

## 2022-08-18 PROCEDURE — 82570 ASSAY OF URINE CREATININE: CPT

## 2022-08-18 PROCEDURE — 80053 COMPREHEN METABOLIC PANEL: CPT

## 2022-08-18 RX ORDER — BETAMETHASONE SODIUM PHOSPHATE AND BETAMETHASONE ACETATE 3; 3 MG/ML; MG/ML
12 INJECTION, SUSPENSION INTRA-ARTICULAR; INTRALESIONAL; INTRAMUSCULAR; SOFT TISSUE EVERY 24 HOURS
Status: DISCONTINUED | OUTPATIENT
Start: 2022-08-18 | End: 2022-08-18 | Stop reason: HOSPADM

## 2022-08-18 RX ADMIN — SODIUM CHLORIDE, POTASSIUM CHLORIDE, SODIUM LACTATE AND CALCIUM CHLORIDE 500 ML: 600; 310; 30; 20 INJECTION, SOLUTION INTRAVENOUS at 11:23

## 2022-08-18 RX ADMIN — BETAMETHASONE SODIUM PHOSPHATE AND BETAMETHASONE ACETATE 12 MG: 3; 3 INJECTION, SUSPENSION INTRA-ARTICULAR; INTRALESIONAL; INTRAMUSCULAR at 14:13

## 2022-08-18 NOTE — PROGRESS NOTES
[facility] rapid covid test r/t fetal and maternal tachycardia. Pt denies COVID symptoms. Pt stated she was positive for covid about a month ago.
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
## 2022-08-18 NOTE — PROGRESS NOTES
1200: Patient resting in bed, receiving fluid bolus. All labs sent  this time. 1330: Per Kenny GONG, patient to receive BMZ dose 1 today and second dose tomorrow afternoon, 24 hours urine to be returned at that time. Patient agreeable to plan. 1430: Patient 24 hours urine started  1445: Patient ambulated off unit, will return tomorrow at 1400 for next dose of BMZ and to return with 24 hour urine.

## 2022-08-18 NOTE — LACTATION NOTE
Mom states\" I plan to breastfeed my baby. I  my other children. \"      Discussed importance of exclusive breastfeeding. Pt. has no questions and is confident with her breastfeeding skills.

## 2022-08-19 ENCOUNTER — HOSPITAL ENCOUNTER (EMERGENCY)
Age: 37
Discharge: HOME | End: 2022-08-19
Attending: OBSTETRICS & GYNECOLOGY | Admitting: OBSTETRICS & GYNECOLOGY
Payer: COMMERCIAL

## 2022-08-19 VITALS
BODY MASS INDEX: 37.17 KG/M2 | HEART RATE: 105 BPM | SYSTOLIC BLOOD PRESSURE: 124 MMHG | HEIGHT: 62 IN | OXYGEN SATURATION: 99 % | DIASTOLIC BLOOD PRESSURE: 85 MMHG | TEMPERATURE: 98.2 F | WEIGHT: 202 LBS | RESPIRATION RATE: 16 BRPM

## 2022-08-19 LAB
ALBUMIN SERPL-MCNC: 2.5 G/DL (ref 3.5–5)
ALBUMIN/GLOB SERPL: 0.7 {RATIO} (ref 1.1–2.2)
ALP SERPL-CCNC: 153 U/L (ref 45–117)
ALT SERPL-CCNC: 41 U/L (ref 12–78)
ANION GAP SERPL CALC-SCNC: 11 MMOL/L (ref 5–15)
AST SERPL-CCNC: 34 U/L (ref 15–37)
BILIRUB SERPL-MCNC: 0.1 MG/DL (ref 0.2–1)
BUN SERPL-MCNC: 4 MG/DL (ref 6–20)
BUN/CREAT SERPL: 8 (ref 12–20)
CALCIUM SERPL-MCNC: 8.6 MG/DL (ref 8.5–10.1)
CHLORIDE SERPL-SCNC: 109 MMOL/L (ref 97–108)
CO2 SERPL-SCNC: 19 MMOL/L (ref 21–32)
COLLECT DURATION TIME UR: 24 HR
COLLECT DURATION TIME UR: 24 HR
CREAT 24H UR-MRATE: 1143 MG/24HR (ref 600–1800)
CREAT SERPL-MCNC: 0.48 MG/DL (ref 0.55–1.02)
CREAT UR-MCNC: 44 MG/DL
ERYTHROCYTE [DISTWIDTH] IN BLOOD BY AUTOMATED COUNT: 24.8 % (ref 11.5–14.5)
GLOBULIN SER CALC-MCNC: 3.4 G/DL (ref 2–4)
GLUCOSE SERPL-MCNC: 89 MG/DL (ref 65–100)
HCT VFR BLD AUTO: 31.5 % (ref 35–47)
HGB BLD-MCNC: 9.3 G/DL (ref 11.5–16)
LDH SERPL L TO P-CCNC: 160 U/L (ref 81–246)
MCH RBC QN AUTO: 23.3 PG (ref 26–34)
MCHC RBC AUTO-ENTMCNC: 29.5 G/DL (ref 30–36.5)
MCV RBC AUTO: 78.9 FL (ref 80–99)
NRBC # BLD: 0 K/UL (ref 0–0.01)
NRBC BLD-RTO: 0 PER 100 WBC
PLATELET # BLD AUTO: 120 K/UL (ref 150–400)
PMV BLD AUTO: 11.5 FL (ref 8.9–12.9)
POTASSIUM SERPL-SCNC: 3.8 MMOL/L (ref 3.5–5.1)
PROT 24H UR-MRATE: 240 MG/24HR
PROT SERPL-MCNC: 5.9 G/DL (ref 6.4–8.2)
PROT UR-MCNC: 8 MG/DL (ref 0–11.9)
PROT/CREAT UR-RTO: 0.2
RBC # BLD AUTO: 3.99 M/UL (ref 3.8–5.2)
SODIUM SERPL-SCNC: 139 MMOL/L (ref 136–145)
SPECIMEN VOL ?TM UR: 3000 ML
SPECIMEN VOL ?TM UR: 3000 ML
WBC # BLD AUTO: 10.2 K/UL (ref 3.6–11)

## 2022-08-19 PROCEDURE — 59025 FETAL NON-STRESS TEST: CPT

## 2022-08-19 PROCEDURE — 96372 THER/PROPH/DIAG INJ SC/IM: CPT

## 2022-08-19 PROCEDURE — 84156 ASSAY OF PROTEIN URINE: CPT

## 2022-08-19 PROCEDURE — 99285 EMERGENCY DEPT VISIT HI MDM: CPT

## 2022-08-19 PROCEDURE — 74011250636 HC RX REV CODE- 250/636: Performed by: OBSTETRICS & GYNECOLOGY

## 2022-08-19 PROCEDURE — 83615 LACTATE (LD) (LDH) ENZYME: CPT

## 2022-08-19 PROCEDURE — 36415 COLL VENOUS BLD VENIPUNCTURE: CPT

## 2022-08-19 PROCEDURE — 80053 COMPREHEN METABOLIC PANEL: CPT

## 2022-08-19 PROCEDURE — 2709999900 HC NON-CHARGEABLE SUPPLY

## 2022-08-19 PROCEDURE — 74011250637 HC RX REV CODE- 250/637: Performed by: OBSTETRICS & GYNECOLOGY

## 2022-08-19 PROCEDURE — 85027 COMPLETE CBC AUTOMATED: CPT

## 2022-08-19 RX ORDER — BETAMETHASONE SODIUM PHOSPHATE AND BETAMETHASONE ACETATE 3; 3 MG/ML; MG/ML
12 INJECTION, SUSPENSION INTRA-ARTICULAR; INTRALESIONAL; INTRAMUSCULAR; SOFT TISSUE ONCE
Status: COMPLETED | OUTPATIENT
Start: 2022-08-19 | End: 2022-08-19

## 2022-08-19 RX ORDER — BUTALBITAL, ACETAMINOPHEN AND CAFFEINE 300; 40; 50 MG/1; MG/1; MG/1
1 CAPSULE ORAL
Qty: 30 CAPSULE | Refills: 0 | Status: SHIPPED | OUTPATIENT
Start: 2022-08-19

## 2022-08-19 RX ORDER — BUTALBITAL, ACETAMINOPHEN AND CAFFEINE 50; 325; 40 MG/1; MG/1; MG/1
1 TABLET ORAL ONCE
Status: COMPLETED | OUTPATIENT
Start: 2022-08-19 | End: 2022-08-19

## 2022-08-19 RX ADMIN — BUTALBITAL, ACETAMINOPHEN, AND CAFFEINE 1 TABLET: 50; 325; 40 TABLET ORAL at 15:16

## 2022-08-19 RX ADMIN — BETAMETHASONE SODIUM PHOSPHATE AND BETAMETHASONE ACETATE 12 MG: 3; 3 INJECTION, SUSPENSION INTRA-ARTICULAR; INTRALESIONAL; INTRAMUSCULAR at 14:41

## 2022-08-19 NOTE — PROGRESS NOTES
1652: Dr. Merlin Hippo called to review PCR and 24hr urine. PCR: 0.2. BPs reviewed with MD. Per Dr. Claire Murry, pt may be discharged home.

## 2022-08-19 NOTE — PROGRESS NOTES
Dr. Bright Stain on unit. Orders placed for a new PCR. If PCR and CMP are comparable to yesterday then she will discharge the patient.

## 2022-08-19 NOTE — PROGRESS NOTES
1410: Patient arrived to labor and delivery for scheduled 2nd dose of betamethasone and to send 24 hour urine. 1416: Dr. Bean Tate CBC, LD, CMP, and continuous monitoring per TANYA Childress RN while waiting for 24 hr urine results    See Jocelyne Yee RN note regarding Dr. Bean Tate pt to come off of EFM and wait for PCR results    1610: Dr. Karin Potter updating this RN on POC. Once PCR results come in, pt can be discharged so long as result is between 0.2-0. 25. MD has reviewed all labwork and vital signs and states that pt headache has improved with Fioricet - MD has ordered prescription for Fioricet upon discharge. MD has already discussed POC with patient, pt has verbalized understanding and agreement. See Liset Avila, LATASHA note regarding Dr. Todd Gómez discharge pt    1708: This RN reviewing discharge instructions and education with patient at this time. Pt verbalizing understanding to all instructions and precautions that would warrant calling her physician or returning to hospital. Pt confirms feeling fetal movement, denies vaginal bleeding/leaking of fluid/vision changes/any other complaints and confirms that headache is improving. IV access removed. Pt discharged at this time and to ambulate off of unit.
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
## 2022-08-31 LAB — GRBS, EXTERNAL: POSITIVE

## 2022-09-06 ENCOUNTER — HOSPITAL ENCOUNTER (INPATIENT)
Age: 37
LOS: 2 days | Discharge: HOME | End: 2022-09-08
Attending: OBSTETRICS & GYNECOLOGY | Admitting: STUDENT IN AN ORGANIZED HEALTH CARE EDUCATION/TRAINING PROGRAM
Payer: COMMERCIAL

## 2022-09-06 ENCOUNTER — ANESTHESIA EVENT (OUTPATIENT)
Dept: LABOR AND DELIVERY | Age: 37
End: 2022-09-06
Payer: COMMERCIAL

## 2022-09-06 ENCOUNTER — ANESTHESIA (OUTPATIENT)
Dept: LABOR AND DELIVERY | Age: 37
End: 2022-09-06
Payer: COMMERCIAL

## 2022-09-06 PROBLEM — Z34.90 PREGNANCY: Status: ACTIVE | Noted: 2022-09-06

## 2022-09-06 LAB
ALBUMIN SERPL-MCNC: 2.5 G/DL (ref 3.5–5)
ALBUMIN/GLOB SERPL: 0.8 {RATIO} (ref 1.1–2.2)
ALP SERPL-CCNC: 196 U/L (ref 45–117)
ALT SERPL-CCNC: 59 U/L (ref 12–78)
ANION GAP SERPL CALC-SCNC: 8 MMOL/L (ref 5–15)
AST SERPL-CCNC: 42 U/L (ref 15–37)
BILIRUB SERPL-MCNC: 0.2 MG/DL (ref 0.2–1)
BUN SERPL-MCNC: 10 MG/DL (ref 6–20)
BUN/CREAT SERPL: 21 (ref 12–20)
CALCIUM SERPL-MCNC: 8.4 MG/DL (ref 8.5–10.1)
CHLORIDE SERPL-SCNC: 111 MMOL/L (ref 97–108)
CO2 SERPL-SCNC: 20 MMOL/L (ref 21–32)
CREAT SERPL-MCNC: 0.47 MG/DL (ref 0.55–1.02)
GLOBULIN SER CALC-MCNC: 3 G/DL (ref 2–4)
GLUCOSE SERPL-MCNC: 78 MG/DL (ref 65–100)
HCT VFR BLD AUTO: 33.8 % (ref 35–47)
HGB BLD-MCNC: 10.6 G/DL (ref 11.5–16)
MCH RBC QN AUTO: 25.6 PG (ref 26–34)
MCHC RBC AUTO-ENTMCNC: 31.4 G/DL (ref 30–36.5)
MCV RBC AUTO: 81.6 FL (ref 80–99)
NRBC # BLD: 0 K/UL (ref 0–0.01)
NRBC BLD-RTO: 0 PER 100 WBC
PLATELET # BLD AUTO: 132 K/UL (ref 150–400)
PMV BLD AUTO: 11.5 FL (ref 8.9–12.9)
POTASSIUM SERPL-SCNC: 3.9 MMOL/L (ref 3.5–5.1)
PROT SERPL-MCNC: 5.5 G/DL (ref 6.4–8.2)
RBC # BLD AUTO: 4.14 M/UL (ref 3.8–5.2)
SODIUM SERPL-SCNC: 139 MMOL/L (ref 136–145)
WBC # BLD AUTO: 5.9 K/UL (ref 3.6–11)

## 2022-09-06 PROCEDURE — 76060000078 HC EPIDURAL ANESTHESIA: Performed by: ANESTHESIOLOGY

## 2022-09-06 PROCEDURE — 80053 COMPREHEN METABOLIC PANEL: CPT

## 2022-09-06 PROCEDURE — 74011250636 HC RX REV CODE- 250/636: Performed by: OBSTETRICS & GYNECOLOGY

## 2022-09-06 PROCEDURE — 36415 COLL VENOUS BLD VENIPUNCTURE: CPT

## 2022-09-06 PROCEDURE — 75410000002 HC LABOR FEE PER 1 HR: Performed by: STUDENT IN AN ORGANIZED HEALTH CARE EDUCATION/TRAINING PROGRAM

## 2022-09-06 PROCEDURE — 3E033VJ INTRODUCTION OF OTHER HORMONE INTO PERIPHERAL VEIN, PERCUTANEOUS APPROACH: ICD-10-PCS | Performed by: STUDENT IN AN ORGANIZED HEALTH CARE EDUCATION/TRAINING PROGRAM

## 2022-09-06 PROCEDURE — 74011250636 HC RX REV CODE- 250/636: Performed by: STUDENT IN AN ORGANIZED HEALTH CARE EDUCATION/TRAINING PROGRAM

## 2022-09-06 PROCEDURE — 10907ZC DRAINAGE OF AMNIOTIC FLUID, THERAPEUTIC FROM PRODUCTS OF CONCEPTION, VIA NATURAL OR ARTIFICIAL OPENING: ICD-10-PCS | Performed by: STUDENT IN AN ORGANIZED HEALTH CARE EDUCATION/TRAINING PROGRAM

## 2022-09-06 PROCEDURE — 77030005513 HC CATH URETH FOL11 MDII -B

## 2022-09-06 PROCEDURE — 2709999900 HC NON-CHARGEABLE SUPPLY

## 2022-09-06 PROCEDURE — 85027 COMPLETE CBC AUTOMATED: CPT

## 2022-09-06 PROCEDURE — 74011000250 HC RX REV CODE- 250: Performed by: NURSE ANESTHETIST, CERTIFIED REGISTERED

## 2022-09-06 PROCEDURE — 74011250637 HC RX REV CODE- 250/637: Performed by: STUDENT IN AN ORGANIZED HEALTH CARE EDUCATION/TRAINING PROGRAM

## 2022-09-06 PROCEDURE — 00HU33Z INSERTION OF INFUSION DEVICE INTO SPINAL CANAL, PERCUTANEOUS APPROACH: ICD-10-PCS | Performed by: ANESTHESIOLOGY

## 2022-09-06 PROCEDURE — 74011000258 HC RX REV CODE- 258: Performed by: OBSTETRICS & GYNECOLOGY

## 2022-09-06 PROCEDURE — 75410000003 HC RECOV DEL/VAG/CSECN EA 0.5 HR: Performed by: STUDENT IN AN ORGANIZED HEALTH CARE EDUCATION/TRAINING PROGRAM

## 2022-09-06 PROCEDURE — 65270000029 HC RM PRIVATE

## 2022-09-06 PROCEDURE — 75410000000 HC DELIVERY VAGINAL/SINGLE: Performed by: STUDENT IN AN ORGANIZED HEALTH CARE EDUCATION/TRAINING PROGRAM

## 2022-09-06 RX ORDER — OXYTOCIN/RINGER'S LACTATE 30/500 ML
87.3 PLASTIC BAG, INJECTION (ML) INTRAVENOUS AS NEEDED
Status: DISCONTINUED | OUTPATIENT
Start: 2022-09-06 | End: 2022-09-08 | Stop reason: HOSPADM

## 2022-09-06 RX ORDER — OXYTOCIN/RINGER'S LACTATE 30/500 ML
10 PLASTIC BAG, INJECTION (ML) INTRAVENOUS AS NEEDED
Status: DISCONTINUED | OUTPATIENT
Start: 2022-09-06 | End: 2022-09-08 | Stop reason: HOSPADM

## 2022-09-06 RX ORDER — SIMETHICONE 80 MG
80 TABLET,CHEWABLE ORAL
Status: DISCONTINUED | OUTPATIENT
Start: 2022-09-06 | End: 2022-09-08 | Stop reason: HOSPADM

## 2022-09-06 RX ORDER — OXYTOCIN/RINGER'S LACTATE 30/500 ML
0-20 PLASTIC BAG, INJECTION (ML) INTRAVENOUS
Status: DISCONTINUED | OUTPATIENT
Start: 2022-09-06 | End: 2022-09-08 | Stop reason: HOSPADM

## 2022-09-06 RX ORDER — BUPROPION HYDROCHLORIDE 150 MG/1
300 TABLET ORAL DAILY
Status: DISCONTINUED | OUTPATIENT
Start: 2022-09-07 | End: 2022-09-08 | Stop reason: HOSPADM

## 2022-09-06 RX ORDER — ESCITALOPRAM OXALATE 10 MG/1
5 TABLET ORAL DAILY
Status: DISCONTINUED | OUTPATIENT
Start: 2022-09-07 | End: 2022-09-08 | Stop reason: HOSPADM

## 2022-09-06 RX ORDER — ALBUTEROL SULFATE 0.83 MG/ML
2.5 SOLUTION RESPIRATORY (INHALATION)
Status: DISCONTINUED | OUTPATIENT
Start: 2022-09-06 | End: 2022-09-08 | Stop reason: HOSPADM

## 2022-09-06 RX ORDER — DOCUSATE SODIUM 100 MG/1
100 CAPSULE, LIQUID FILLED ORAL
Status: DISCONTINUED | OUTPATIENT
Start: 2022-09-06 | End: 2022-09-08 | Stop reason: HOSPADM

## 2022-09-06 RX ORDER — OXYTOCIN/RINGER'S LACTATE 30/500 ML
10 PLASTIC BAG, INJECTION (ML) INTRAVENOUS AS NEEDED
Status: COMPLETED | OUTPATIENT
Start: 2022-09-06 | End: 2022-09-06

## 2022-09-06 RX ORDER — OXYCODONE AND ACETAMINOPHEN 5; 325 MG/1; MG/1
1 TABLET ORAL
Status: DISCONTINUED | OUTPATIENT
Start: 2022-09-06 | End: 2022-09-08 | Stop reason: HOSPADM

## 2022-09-06 RX ORDER — SODIUM CHLORIDE, SODIUM LACTATE, POTASSIUM CHLORIDE, CALCIUM CHLORIDE 600; 310; 30; 20 MG/100ML; MG/100ML; MG/100ML; MG/100ML
125 INJECTION, SOLUTION INTRAVENOUS CONTINUOUS
Status: DISCONTINUED | OUTPATIENT
Start: 2022-09-06 | End: 2022-09-08 | Stop reason: HOSPADM

## 2022-09-06 RX ORDER — ACETAMINOPHEN 325 MG/1
650 TABLET ORAL
Status: DISCONTINUED | OUTPATIENT
Start: 2022-09-06 | End: 2022-09-08 | Stop reason: HOSPADM

## 2022-09-06 RX ORDER — IBUPROFEN 800 MG/1
800 TABLET ORAL EVERY 8 HOURS
Status: DISCONTINUED | OUTPATIENT
Start: 2022-09-06 | End: 2022-09-08 | Stop reason: HOSPADM

## 2022-09-06 RX ORDER — NALOXONE HYDROCHLORIDE 0.4 MG/ML
0.4 INJECTION, SOLUTION INTRAMUSCULAR; INTRAVENOUS; SUBCUTANEOUS AS NEEDED
Status: DISCONTINUED | OUTPATIENT
Start: 2022-09-06 | End: 2022-09-08 | Stop reason: HOSPADM

## 2022-09-06 RX ORDER — ONDANSETRON 4 MG/1
4 TABLET, ORALLY DISINTEGRATING ORAL
Status: ACTIVE | OUTPATIENT
Start: 2022-09-06 | End: 2022-09-07

## 2022-09-06 RX ORDER — ALBUTEROL SULFATE 90 UG/1
2 AEROSOL, METERED RESPIRATORY (INHALATION)
Status: DISCONTINUED | OUTPATIENT
Start: 2022-09-06 | End: 2022-09-06

## 2022-09-06 RX ORDER — LIDOCAINE HYDROCHLORIDE AND EPINEPHRINE 15; 5 MG/ML; UG/ML
INJECTION, SOLUTION EPIDURAL AS NEEDED
Status: DISCONTINUED | OUTPATIENT
Start: 2022-09-06 | End: 2022-09-06 | Stop reason: HOSPADM

## 2022-09-06 RX ORDER — BUPIVACAINE HYDROCHLORIDE 2.5 MG/ML
INJECTION, SOLUTION EPIDURAL; INFILTRATION; INTRACAUDAL AS NEEDED
Status: DISCONTINUED | OUTPATIENT
Start: 2022-09-06 | End: 2022-09-06 | Stop reason: HOSPADM

## 2022-09-06 RX ORDER — NALOXONE HYDROCHLORIDE 0.4 MG/ML
0.4 INJECTION, SOLUTION INTRAMUSCULAR; INTRAVENOUS; SUBCUTANEOUS AS NEEDED
Status: DISCONTINUED | OUTPATIENT
Start: 2022-09-06 | End: 2022-09-06 | Stop reason: HOSPADM

## 2022-09-06 RX ORDER — DIPHENHYDRAMINE HCL 25 MG
25 CAPSULE ORAL
Status: DISCONTINUED | OUTPATIENT
Start: 2022-09-06 | End: 2022-09-08 | Stop reason: HOSPADM

## 2022-09-06 RX ORDER — SODIUM CHLORIDE 0.9 % (FLUSH) 0.9 %
5-40 SYRINGE (ML) INJECTION EVERY 8 HOURS
Status: DISCONTINUED | OUTPATIENT
Start: 2022-09-06 | End: 2022-09-08 | Stop reason: HOSPADM

## 2022-09-06 RX ORDER — HYDROCORTISONE ACETATE PRAMOXINE HCL 2.5; 1 G/100G; G/100G
CREAM TOPICAL AS NEEDED
Status: DISCONTINUED | OUTPATIENT
Start: 2022-09-06 | End: 2022-09-06

## 2022-09-06 RX ORDER — ONDANSETRON 2 MG/ML
4 INJECTION INTRAMUSCULAR; INTRAVENOUS
Status: DISCONTINUED | OUTPATIENT
Start: 2022-09-06 | End: 2022-09-06 | Stop reason: HOSPADM

## 2022-09-06 RX ORDER — SODIUM CHLORIDE 0.9 % (FLUSH) 0.9 %
5-40 SYRINGE (ML) INJECTION AS NEEDED
Status: DISCONTINUED | OUTPATIENT
Start: 2022-09-06 | End: 2022-09-08 | Stop reason: HOSPADM

## 2022-09-06 RX ORDER — FENTANYL/BUPIVACAINE/NS/PF 2-1250MCG
1-16 PREFILLED PUMP RESERVOIR EPIDURAL CONTINUOUS
Status: DISCONTINUED | OUTPATIENT
Start: 2022-09-06 | End: 2022-09-06 | Stop reason: HOSPADM

## 2022-09-06 RX ADMIN — SODIUM CHLORIDE, POTASSIUM CHLORIDE, SODIUM LACTATE AND CALCIUM CHLORIDE 500 ML: 600; 310; 30; 20 INJECTION, SOLUTION INTRAVENOUS at 08:46

## 2022-09-06 RX ADMIN — OXYTOCIN 2 MILLI-UNITS/MIN: 10 INJECTION INTRAVENOUS at 08:43

## 2022-09-06 RX ADMIN — IBUPROFEN 800 MG: 800 TABLET ORAL at 15:29

## 2022-09-06 RX ADMIN — IBUPROFEN 800 MG: 800 TABLET ORAL at 22:27

## 2022-09-06 RX ADMIN — SODIUM CHLORIDE, POTASSIUM CHLORIDE, SODIUM LACTATE AND CALCIUM CHLORIDE 125 ML/HR: 600; 310; 30; 20 INJECTION, SOLUTION INTRAVENOUS at 08:00

## 2022-09-06 RX ADMIN — BUPIVACAINE HYDROCHLORIDE 8 ML: 2.5 INJECTION, SOLUTION EPIDURAL; INFILTRATION; INTRACAUDAL; PERINEURAL at 10:26

## 2022-09-06 RX ADMIN — SODIUM CHLORIDE 2.5 MILLION UNITS: 9 INJECTION, SOLUTION INTRAVENOUS at 12:08

## 2022-09-06 RX ADMIN — Medication 10 ML/HR: at 10:42

## 2022-09-06 RX ADMIN — LIDOCAINE HYDROCHLORIDE AND EPINEPHRINE 3 ML: 15; 5 INJECTION, SOLUTION EPIDURAL at 10:26

## 2022-09-06 RX ADMIN — SODIUM CHLORIDE 5 MILLION UNITS: 900 INJECTION INTRAVENOUS at 08:03

## 2022-09-06 RX ADMIN — OXYTOCIN 999 MILLI-UNITS: 10 INJECTION INTRAVENOUS at 14:08
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
## 2022-09-06 NOTE — ANESTHESIA PREPROCEDURE EVALUATION
Relevant Problems   No relevant active problems       Anesthetic History   No history of anesthetic complications            Review of Systems / Medical History  Patient summary reviewed, nursing notes reviewed and pertinent labs reviewed    Pulmonary            Asthma : well controlled       Neuro/Psych     seizures: well controlled    Psychiatric history    Comments: sz with wellbutrin at high doses - no seizures since college Cardiovascular    Hypertension: well controlled                Comments: GHTN - no meds   GI/Hepatic/Renal  Within defined limits              Endo/Other        Obesity     Other Findings              Physical Exam    Airway  Mallampati: II  TM Distance: 4 - 6 cm         Cardiovascular               Dental  No notable dental hx       Pulmonary                 Abdominal         Other Findings            Anesthetic Plan    ASA: 2  Anesthesia type: epidural            Anesthetic plan and risks discussed with: Patient and Spouse      Risks including headache backache, failure to work and need for replacement, infection and nerve injury discussed with pt. Pt chooses to proceed with epidural. Consent signed. Note entered after procedure.

## 2022-09-06 NOTE — ANESTHESIA PROCEDURE NOTES
Epidural Block    Reason for block: labor epidural  Staffing  Performed: CRNA   Resident/CRNA: Shalonda Cooley CRNA  Preanesthetic Checklist  Completed: patient identified, IV checked, site marked, risks and benefits discussed, surgical consent, monitors and equipment checked, pre-op evaluation, timeout performed and fire risk safety assessment completed and verbalized  Block Placement  Patient position: sitting  Prep: ChloraPrep  Sterility prep: cap, drape, gloves, hand and mask  Sedation level: no sedation  Patient monitoring: heart rate, frequent blood pressure checks and continuous pulse oximetry  Approach: midline  Location: lumbar  Lumbar location: L3-L4  Epidural  Loss of resistance technique: air  Guidance: landmark technique  Needle  Needle type: Tuohy   Needle gauge: 17 G  Needle length: 9 cm  Needle insertion depth: 4 cm  Catheter type: stylet  Catheter size: 20 G  Catheter at skin depth: 9 cm  Catheter securement method: surgical tape, stabilization device and clear occlusive dressing  Test dose: negative  Assessment  Number of attempts: 1  Procedure assessment: patient tolerated procedure well with no immediate complications  Additional Notes  Epidural easily placed x 1 attempt  RAJ at 4 cm  Flex catheter easily threaded to 9 cm  Neg heme, neg paresthesia, neg csf

## 2022-09-06 NOTE — PROGRESS NOTES
0800 pt arrived for induction of labor for gest htn. Iv started, labs sent and pt placed on efm    0824 called Dr Robby Chance, order received for pitocin    1017 pt sitting up for epidural, unable to trace fht at this time due to maternal positioning    1100 spoke with Dr Robby Chance, states assessed perineum for hsv and none noted during sve, pt states no outbreak during pregnancy.     1330 dr jorgensen in, assessed efm, performed sve 10 cm/0 station, aware of prolonged deceleration and interventions, aware pitocin turned off and iv fluid bolus in process    1350 pt began pushing, this rn remains at bedside evaluating fhr tracing and providing interventions    1353 vaginal delivery viable male    4153 sbar report given to Zhenai

## 2022-09-06 NOTE — PROGRESS NOTES
1550-Received report of pt now. Assume care of pt. RN x 2 at bedside. Pt sitting up in bed to breastfeed. 1720-Called report to Osito Ramirez RN-for psych history. 1725-Pt assisted up to br to void. Unmeasured void now. 1 small blood clot noted in toilet. 1735-Pt transported to  #312 via wc w/ rn and  at side.

## 2022-09-06 NOTE — PROGRESS NOTES
Labor Progress Note  Patient seen, fetal heart rate and contraction pattern evaluated, patient examined. Patient Vitals for the past 1 hrs:   BP Pulse SpO2   09/06/22 1119 -- -- 100 %   09/06/22 1114 -- -- 100 %   09/06/22 1112 128/81 (!) 101 --   09/06/22 1109 -- -- 100 %   09/06/22 1104 -- -- 100 %       Physical Exam:  Cervical Exam:  4/80/-3  Membranes:  Artificial Rupture of Membranes; Amniotic Fluid: medium amount of clear fluid  Uterine Activity: q5 min  Fetal Heart Rate: 140s/mod latha/+accels/no decels    Assessment/Plan:  Cat I tracing. Received epidural. Pit @ 10. [facility].    Rowdy Ortiz MD  9/6/2022  12:00 PM

## 2022-09-06 NOTE — L&D DELIVERY NOTE
Delivery Summary  Patient: Dagmar Tinsley             Circumcision:   desires  Additional Delivery Comments - Presented for IOL for known preeclampsia without severe features. Progressed with pitocin and amniotomy to C/C/0 over approximately 6 hours. Called out with pressure and found to be C/C/0. Encouraged to push with contractions and on second push there was delivery for fetal head in OA position over intact perineum. Head restituted to maternal left. Anterior shoulder delivered easily followed by posterior shoulder and rest of fetal body. Baby passed to mom and cried spontaneously. After 2 minutes cord was doubly clamped and cut. Cord blood collected. Placenta delivered easily with gentle traction. Fundus firm at umbilicus. No lacerations identified. At time of note mom and baby doing well. Information for the patient's :  Enriqueta Zhu [[id number]]     Delivery Type: Vaginal, Spontaneous   Delivery Date: 2022   Delivery Time: 1:53 PM     Birth Weight:       Sex:  male  Delivery Clinician:  Devendra Peralta   Gestational Age: 37w0d    Presentation: Vertex   Position: Right  Occiput  Anterior     Apgars were 8  and 9      Resuscitation Method: Suctioning-bulb; Tactile Stimulation     Meconium Stained: None    Living Status: Living       Placenta Date/Time: 2022  1:57 PM   Placenta Removal: Expressed   Placenta Appearance: Normal    Cord Information: 3 Vessels    Cord Events: None       Disposition of Cord Blood: Lab    Blood Gases Sent?:  No     Cord pH:  none    Episiotomy: None   Laceration(s): None     Estimated Blood Loss (ml): No data found    Labor Events  Method: Oxytocin      Augmentation: None   Cervical Ripening:     None        Operative Vaginal Delivery - none Spontaneous, unlabored and symmetrical

## 2022-09-07 PROCEDURE — 74011250637 HC RX REV CODE- 250/637: Performed by: STUDENT IN AN ORGANIZED HEALTH CARE EDUCATION/TRAINING PROGRAM

## 2022-09-07 PROCEDURE — 65270000029 HC RM PRIVATE

## 2022-09-07 RX ADMIN — IBUPROFEN 800 MG: 800 TABLET ORAL at 14:01

## 2022-09-07 RX ADMIN — IBUPROFEN 800 MG: 800 TABLET ORAL at 06:00

## 2022-09-07 RX ADMIN — ESCITALOPRAM OXALATE 5 MG: 10 TABLET ORAL at 09:00

## 2022-09-07 RX ADMIN — BUPROPION HYDROCHLORIDE 300 MG: 150 TABLET, EXTENDED RELEASE ORAL at 08:57

## 2022-09-07 RX ADMIN — ACETAMINOPHEN 650 MG: 325 TABLET ORAL at 09:03
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
## 2022-09-07 NOTE — PROGRESS NOTES
Post-Partum Day Number 1 Progress Note    Elida De León     Assessment: Doing well, post partum day 1    Plan:  - preE without SF, No meds. PIH neg, PC 0.35. Normotensive. Asymptomatic.   - Hx of depression - on lexapro and welbutrin. - Continue routine postpartum and perineal care as well as maternal education.  - The risks and benefits of the circumcision  procedure and anesthesia including: bleeding, infection, variability of cosmetic results were discussed at length with the mother. She is aware that future repeat procedures may be necessary. She gives informed consent to proceed as noted and her questions are answered. - Plan discharge home [id number] Discharge Date: Tomorrow. Information for the patient's :  Letha Rasheed, Male [place] [[id number]]   Vaginal, Spontaneous  Patient doing well without significant complaint. Voiding without difficulty, normal lochia. Denies fever, chills, CP, SOB, calf pain. Denies HA, VC, RUQ pain. Vitals:  Visit Vitals  /82 (BP 1 Location: Right upper arm, BP Patient Position: At rest)   Pulse 80   Temp 97.5 °F (36.4 °C)   Resp 18   Ht 5' 2\" (1.575 m)   Wt 91.6 kg (202 lb)   SpO2 96%   Breastfeeding Unknown   BMI 36.95 kg/m²     Temp (24hrs), Av.9 °F (36.6 °C), Min:97.5 °F (36.4 °C), Max:98.3 °F (36.8 °C)        Exam:   Patient without distress. Fundus firm, nontender per nursing fundal checks. Perineum with normal lochia noted per nursing assessment. Lower extremities are negative for pathological edema.     Labs:     Lab Results   Component Value Date/Time    WBC 5.9 2022 07:34 AM    WBC 10.2 2022 02:53 PM    WBC 8.1 2022 11:30 AM    WBC 10.3 2021 10:19 AM    WBC 7.1 2021 10:53 AM    WBC 7.9 2021 12:00 AM    WBC 10.4 2019 04:09 PM    WBC 10.2 2019 02:58 PM    WBC 8.8 2019 02:39 PM    WBC 7.6 2018 12:31 PM    WBC 10.5 01/15/2014 04:40 AM    WBC 8.6 01/14/2014 06:50 PM    WBC 10.5 01/08/2014 06:30 AM    WBC 6.0 06/08/2011 01:33 PM    WBC 7.0 05/25/2011 09:48 PM    WBC 8.5 03/02/2011 06:55 PM    HGB 10.6 (L) 09/06/2022 07:34 AM    HGB 9.3 (L) 08/19/2022 02:53 PM    HGB 9.5 (L) 08/18/2022 11:30 AM    HGB 9.7 (L) 08/29/2021 10:19 AM    HGB 10.3 (L) 06/22/2021 10:53 AM    HGB 13.0 02/26/2021 12:00 AM    HGB 12.2 07/07/2019 04:09 PM    HGB 10.9 (L) 05/23/2019 02:58 PM    HGB 11.4 04/17/2019 02:39 PM    HGB 12.5 11/30/2018 12:31 PM    HGB 8.2 (L) 01/15/2014 04:40 AM    HGB 11.1 (L) 01/14/2014 06:50 PM    HGB 12.0 01/08/2014 06:30 AM    HGB 13.9 06/08/2011 01:33 PM    HGB 15.0 05/25/2011 09:48 PM    HGB 13.5 03/02/2011 06:55 PM    HCT 33.8 (L) 09/06/2022 07:34 AM    HCT 31.5 (L) 08/19/2022 02:53 PM    HCT 32.2 (L) 08/18/2022 11:30 AM    HCT 32.1 (L) 08/29/2021 10:19 AM    HCT 32.7 (L) 06/22/2021 10:53 AM    HCT 37.7 02/26/2021 12:00 AM    HCT 37.1 07/07/2019 04:09 PM    HCT 33.0 (L) 05/23/2019 02:58 PM    HCT 34.6 04/17/2019 02:39 PM    HCT 38.8 11/30/2018 12:31 PM    HCT 24.0 (L) 01/15/2014 04:40 AM    HCT 32.9 (L) 01/14/2014 06:50 PM    HCT 36.1 01/08/2014 06:30 AM    HCT 40.5 06/08/2011 01:33 PM    HCT 42.3 05/25/2011 09:48 PM    HCT 39.3 03/02/2011 06:55 PM    PLATELET 335 (L) 12/44/2661 07:34 AM    PLATELET 488 (L) 45/25/5517 02:53 PM    PLATELET 034 (L) 94/30/4942 11:30 AM    PLATELET 865 35/77/0142 10:19 AM    PLATELET 195 37/18/1184 10:53 AM    PLATELET 189 64/00/3029 12:00 AM    PLATELET 230 76/90/1344 04:09 PM    PLATELET 327 (L) 11/72/3856 04:42 PM    PLATELET 130 65/46/4655 02:58 PM    PLATELET 589 (L) 54/68/1260 02:39 PM    PLATELET 279 27/42/6406 12:31 PM    PLATELET 804 (L) 63/43/9535 04:40 AM    PLATELET 437 (L) 46/91/5616 06:50 PM    PLATELET 034 (L) 66/98/7406 06:30 AM    PLATELET 049 (L) 49/11/9678 01:33 PM    PLATELET 802 91/53/1171 09:48 PM    PLATELET 707 09/25/5898 06:55 PM    Hgb, External 13.3 07/12/2013 12:00 AM    Hct, External 40.1 07/12/2013 12:00 AM    Platelet cnt., External 198 07/12/2013 12:00 AM       No results found for this or any previous visit (from the past 24 hour(s)).

## 2022-09-08 VITALS
BODY MASS INDEX: 37.17 KG/M2 | SYSTOLIC BLOOD PRESSURE: 112 MMHG | RESPIRATION RATE: 16 BRPM | HEART RATE: 82 BPM | TEMPERATURE: 97.6 F | DIASTOLIC BLOOD PRESSURE: 75 MMHG | OXYGEN SATURATION: 97 % | HEIGHT: 62 IN | WEIGHT: 202 LBS

## 2022-09-08 PROCEDURE — 74011250637 HC RX REV CODE- 250/637: Performed by: STUDENT IN AN ORGANIZED HEALTH CARE EDUCATION/TRAINING PROGRAM

## 2022-09-08 RX ORDER — ACETAMINOPHEN 325 MG/1
650 TABLET ORAL
Qty: 60 TABLET | Refills: 1 | Status: SHIPPED | OUTPATIENT
Start: 2022-09-08

## 2022-09-08 RX ORDER — IBUPROFEN 800 MG/1
800 TABLET ORAL
Qty: 60 TABLET | Refills: 1 | Status: SHIPPED | OUTPATIENT
Start: 2022-09-08

## 2022-09-08 RX ADMIN — DOCUSATE SODIUM 100 MG: 100 CAPSULE, LIQUID FILLED ORAL at 10:37

## 2022-09-08 RX ADMIN — BUPROPION HYDROCHLORIDE 300 MG: 150 TABLET, EXTENDED RELEASE ORAL at 10:36

## 2022-09-08 RX ADMIN — IBUPROFEN 800 MG: 800 TABLET ORAL at 05:09

## 2022-09-08 NOTE — DISCHARGE INSTRUCTIONS
POST DELIVERY DISCHARGE INSTRUCTIONS    Name: Cas Man  YOB: 1985  Primary Diagnosis: Active Problems:    Pregnancy (9/6/2022)        General:     Diet/Diet Restrictions:  Eight 8-ounce glasses of fluid daily (water, juices); avoid excessive caffeine intake. Meals/snacks as desired which are high in fiber and carbohydrates and low in fat and cholesterol. Medications:   {Medication reconciliation information is now added to the patient's AVS automatically when it is printed. There is no need to use this SmartLink in discharge instructions. Highlight this text and delete it to clear this message}      Physical Activity / Restrictions / Safety:     Avoid heavy lifting, no more that 8 lbs. For 2-3 weeks;   Limit use of stairs to 2 times daily for the first week home. No driving for one week. Avoid intercourse 4-6 weeks, no douching or tampon use. Check with obstetrician before starting or resuming an exercise program.      Discharge Instructions/Special Treatment/Home Care Needs:     Continue prenatal vitamins. Continue to use squirt bottle with warm water on your episiotomy after each bathroom use until bleeding stops. If steri-strips applied to your incision, remove in 7-10 days. Call your doctor for the following:     Fever over 101 degrees by mouth. Vaginal bleeding heavier than a normal menstrual period or clots larger than a golf ball. Red streaks or increased swelling of legs, painful red streaks on your breast.  Painful urination, constipation and increased pain or swelling or discharge with your incision. If you feel extremely anxious or overwhelmed. If you have thoughts of harming yourself and/or your baby. Pain Management:     Take Acetaminophen (Tylenol) or Ibuprofen (Advil, Motrin), as directed for pain. Use a warm Sitz bath 3 times daily to relieve episiotomy or hemorrhoidal discomfort.    For hemorrhoidal discomfort, use Tucks and Anusol cream as needed and directed. Heating pad to  incision as needed.      Follow-Up Care:     Appointment with MD: 2 weeks   Telephone number: [phone]    Signed By: Cassie Barone MD                                                                                                   Date: 2022 Time: 12:36 PM

## 2022-09-08 NOTE — LACTATION NOTE
This note was copied from a baby's chart. Mother has baby on her chest resting. Mother states BF is going well, denies questions or needs. Printed discharge info given. Chart shows numerous feedings, void, stool WNL. Discussed importance of monitoring outputs and feedings on first week of life. Discussed ways to tell if baby is  getting enough breast milk, ie  voids and stools, change in color of stool, and return to birth wt within 2 weeks. Follow up with pediatrician visit for weight check in 1-2 days (per AAP guidelines.)  Encouraged to call Warm [facility]  [phone]  for any questions/problems that arise. Mother also given breastfeeding support group dates and times for any future needs     Engorgement Care Guidelines:  Reviewed how milk is made and normal phases of milk production. Taught care of engorged breasts - frequent breastfeeding encouraged, cool packs and motrin as tolerated. Anticipatory guidance shared. Pt will successfully establish breastfeeding by feeding in response to early feeding cues   or wake every 3h, will obtain deep latch, and will keep log of feedings/output. Taught to BF at hunger cues and or q 2-3 hrs and to offer 10-20 drops of hand expressed colostrum at any non-feeds.       Breast Assessment  Left Breast: Medium  Left Nipple: Everted, Intact  Right Breast: Medium  Right Nipple: Everted, Intact  Breast- Feeding Assessment  Breast-Feeding Experience: Yes (with all previous children)  Breast Trauma/Surgery: Yes (hx of mastitis)  Type/Quality: Good  Lactation Consultant Visits  Breast-Feedings: Good  (per mother)  Mother/Infant Observation  Mother Observation: Breast comfortable  Infant Observation: Opens mouth, Breast tissue moves, Latches nipple and aereolae, Lips flanged, lower, Lips flanged, upper, Frenulum checked, Feeding cues

## 2022-09-08 NOTE — DISCHARGE SUMMARY
Obstetrical Discharge Summary     Name: Teri Coronel MRN: [id number]  SSN: [id number]    YOB: 1985  Age: 40 y.o. Sex: female      Admit Date: 2022    Discharge Date: 2022     Admitting Physician: Billy Neal MD     Attending Physician:  Wilfredo Johnson MD     Admission Diagnoses: Pregnancy [Z34.90]    Discharge Diagnoses:   Information for the patient's :  Towana Severe Male Andrew Spine [[id number]]   Delivery of a 2.915 kg male infant via Vaginal, Spontaneous on 2022 at 1:53 PM  by Billy Neal. Apgars were 8  and 9 . Additional Diagnoses:   Hospital Problems  Date Reviewed: 2021            Codes Class Noted POA    Pregnancy ICD-10-CM: Z34.90  ICD-9-CM: V22.2  2022 Unknown          Lab Results   Component Value Date/Time    Rubella, External immune 02/15/2022 12:00 AM    GrBStrep, External positive 2022 12:00 AM       Hospital Course: 39 yo [id number] admitted for IOL for Pre-E w/o SF. IOL started with pit and AROM. Treated for GBS with PCN. Patient progressed and underwent . Bps normotensive prior to discharge. 1-2 week mood check requested for history of anx/depression     Patient Instructions:   Current Discharge Medication List        START taking these medications    Details   acetaminophen (TYLENOL) 325 mg tablet Take 2 Tablets by mouth every four (4) hours as needed for Pain. Qty: 60 Tablet, Refills: 1      ibuprofen (MOTRIN) 800 mg tablet Take 1 Tablet by mouth every eight (8) hours as needed for Pain. Qty: 60 Tablet, Refills: 1           CONTINUE these medications which have NOT CHANGED    Details   butalbital-acetaminophen-caff (FIORICET) -40 mg per capsule Take 1 Capsule by mouth every six (6) hours as needed for Headache or Migraine. Qty: 30 Capsule, Refills: 0      escitalopram oxalate (LEXAPRO) 20 mg tablet daily.       buPROPion XL (WELLBUTRIN XL) 300 mg XL tablet TAKE 1 TABLET BY MOUTH EVERY MORNING      cetirizine (ZYRTEC) 10 mg tablet Take  by mouth.      [id number] lolis-folic-dha -028 mg-mcg-mg cmpk Take 1 Tab by mouth nightly. ferrous sulfate (IRON PO) Take  by mouth. albuterol (PROVENTIL HFA, VENTOLIN HFA) 90 mcg/actuation inhaler Take 2 Puffs by inhalation every four (4) hours as needed for Wheezing or Shortness of Breath. Qty: 1 Inhaler, Refills: 1             Disposition at Discharge: Home or self care    Condition at Discharge: Stable    Reference my discharge instructions. Follow-up Appointments   Procedures    FOLLOW UP VISIT Appointment in: Two Weeks     Standing Status:   Standing     Number of Occurrences:   1     Order Specific Question:   Appointment in     Answer:    Two Weeks        Signed By:  Azar Nicole MD     September 8, 2022

## 2022-09-08 NOTE — PROGRESS NOTES
Post-Partum Day Number 2 Progress Note    Zelpha Given is a 47yo [id number] PPD 2 s/p . Patient diagnosed with Pre-E w/o SF. Assessment: Doing well, post partum day 2    Plan:   - Discharge home today  - Follow up in office in 2 week(s) with [facility].  - Pain medication prescription(s) sent. - Questions answered. Pre-E w/o SF:   -Normotensive in last 24 hours  -Return precautions discussed  -Will request 1-2 week BP check in the office    Anx/ Depression:   -Continue home lexapro and wellbutrin  -1-2 week mood check requested     Iglesia Bella MD  Obstetrics and Gynecology   [address] for the patient's :  Charles Toure, Male Tawnya Mendoza [[id number]]   Vaginal, Spontaneous  Patient doing well without significant complaint. Voiding without difficulty, normal lochia. Ready for discharge home. Vitals:  Visit Vitals  /75 (BP 1 Location: Left upper arm, BP Patient Position: At rest)   Pulse 82   Temp 97.6 °F (36.4 °C)   Resp 16   Ht 5' 2\" (1.575 m)   Wt 91.6 kg (202 lb)   SpO2 97%   Breastfeeding Unknown   BMI 36.95 kg/m²     Temp (24hrs), Av.1 °F (36.7 °C), Min:97.6 °F (36.4 °C), Max:98.6 °F (37 °C)      Exam:        Patient without distress.                 Fundus firm, nontender per nursing fundal checks                Perineum with normal lochia noted per nursing assessment                Lower extremities are negative for pathological edema    Labs:     Lab Results   Component Value Date/Time    WBC 5.9 2022 07:34 AM    WBC 10.2 2022 02:53 PM    WBC 8.1 2022 11:30 AM    WBC 10.3 2021 10:19 AM    WBC 7.1 2021 10:53 AM    WBC 7.9 2021 12:00 AM    WBC 10.4 2019 04:09 PM    WBC 10.2 2019 02:58 PM    WBC 8.8 2019 02:39 PM    WBC 7.6 2018 12:31 PM    WBC 10.5 01/15/2014 04:40 AM    WBC 8.6 2014 06:50 PM    WBC 10.5 2014 06:30 AM    WBC 6.0 2011 01:33 PM    WBC 7.0 05/25/2011 09:48 PM    WBC 8.5 03/02/2011 06:55 PM    HGB 10.6 (L) 09/06/2022 07:34 AM    HGB 9.3 (L) 08/19/2022 02:53 PM    HGB 9.5 (L) 08/18/2022 11:30 AM    HGB 9.7 (L) 08/29/2021 10:19 AM    HGB 10.3 (L) 06/22/2021 10:53 AM    HGB 13.0 02/26/2021 12:00 AM    HGB 12.2 07/07/2019 04:09 PM    HGB 10.9 (L) 05/23/2019 02:58 PM    HGB 11.4 04/17/2019 02:39 PM    HGB 12.5 11/30/2018 12:31 PM    HGB 8.2 (L) 01/15/2014 04:40 AM    HGB 11.1 (L) 01/14/2014 06:50 PM    HGB 12.0 01/08/2014 06:30 AM    HGB 13.9 06/08/2011 01:33 PM    HGB 15.0 05/25/2011 09:48 PM    HGB 13.5 03/02/2011 06:55 PM    HCT 33.8 (L) 09/06/2022 07:34 AM    HCT 31.5 (L) 08/19/2022 02:53 PM    HCT 32.2 (L) 08/18/2022 11:30 AM    HCT 32.1 (L) 08/29/2021 10:19 AM    HCT 32.7 (L) 06/22/2021 10:53 AM    HCT 37.7 02/26/2021 12:00 AM    HCT 37.1 07/07/2019 04:09 PM    HCT 33.0 (L) 05/23/2019 02:58 PM    HCT 34.6 04/17/2019 02:39 PM    HCT 38.8 11/30/2018 12:31 PM    HCT 24.0 (L) 01/15/2014 04:40 AM    HCT 32.9 (L) 01/14/2014 06:50 PM    HCT 36.1 01/08/2014 06:30 AM    HCT 40.5 06/08/2011 01:33 PM    HCT 42.3 05/25/2011 09:48 PM    HCT 39.3 03/02/2011 06:55 PM    PLATELET 249 (L) 21/02/4388 07:34 AM    PLATELET 522 (L) 65/14/2579 02:53 PM    PLATELET 567 (L) 88/42/2730 11:30 AM    PLATELET 113 64/91/5308 10:19 AM    PLATELET 264 63/58/7324 10:53 AM    PLATELET 893 20/67/8926 12:00 AM    PLATELET 604 67/03/7726 04:09 PM    PLATELET 621 (L) 84/71/9373 04:42 PM    PLATELET 478 11/45/0473 02:58 PM    PLATELET 397 (L) 12/98/9959 02:39 PM    PLATELET 790 99/93/7432 12:31 PM    PLATELET 666 (L) 82/51/9028 04:40 AM    PLATELET 795 (L) 14/45/0170 06:50 PM    PLATELET 052 (L) 46/23/7792 06:30 AM    PLATELET 407 (L) 72/91/5046 01:33 PM    PLATELET 621 13/32/6023 09:48 PM    PLATELET 131 37/74/8111 06:55 PM    Hgb, External 13.3 07/12/2013 12:00 AM    Hct, External 40.1 07/12/2013 12:00 AM    Platelet cnt., External 198 07/12/2013 12:00 AM       No results found for this or any previous visit (from the past 24 hour(s)).

## 2023-01-20 NOTE — TELEPHONE ENCOUNTER
Patient is calling at 3:09 pm requesting a letter to be faxed for her to be able to do CPR/AED training tomorrow at 9 am at work. They will be able to adjust the dummies up on a table top for her so she will not be down on hands and knees.       Fax to [phone] thelma Urias Statement Selected